# Patient Record
Sex: FEMALE | Race: WHITE
[De-identification: names, ages, dates, MRNs, and addresses within clinical notes are randomized per-mention and may not be internally consistent; named-entity substitution may affect disease eponyms.]

---

## 2017-01-03 ENCOUNTER — HOSPITAL ENCOUNTER (EMERGENCY)
Dept: HOSPITAL 17 - PHED | Age: 37
Discharge: LEFT BEFORE DISCHARGE | End: 2017-01-03
Payer: MEDICAID

## 2017-01-03 ENCOUNTER — HOSPITAL ENCOUNTER (EMERGENCY)
Dept: HOSPITAL 17 - NEPE | Age: 37
LOS: 1 days | Discharge: HOME | End: 2017-01-04
Payer: MEDICAID

## 2017-01-03 VITALS
DIASTOLIC BLOOD PRESSURE: 80 MMHG | HEART RATE: 103 BPM | OXYGEN SATURATION: 98 % | SYSTOLIC BLOOD PRESSURE: 159 MMHG | RESPIRATION RATE: 16 BRPM | TEMPERATURE: 97.2 F

## 2017-01-03 VITALS — WEIGHT: 99.21 LBS | HEIGHT: 63 IN | BODY MASS INDEX: 17.58 KG/M2

## 2017-01-03 VITALS
SYSTOLIC BLOOD PRESSURE: 140 MMHG | TEMPERATURE: 98.6 F | OXYGEN SATURATION: 98 % | RESPIRATION RATE: 18 BRPM | DIASTOLIC BLOOD PRESSURE: 103 MMHG | HEART RATE: 111 BPM

## 2017-01-03 VITALS — BODY MASS INDEX: 17.97 KG/M2 | HEIGHT: 63 IN | WEIGHT: 101.41 LBS

## 2017-01-03 DIAGNOSIS — Z53.20: ICD-10-CM

## 2017-01-03 DIAGNOSIS — R10.2: ICD-10-CM

## 2017-01-03 DIAGNOSIS — R11.2: ICD-10-CM

## 2017-01-03 DIAGNOSIS — N80.9: ICD-10-CM

## 2017-01-03 DIAGNOSIS — R10.9: Primary | ICD-10-CM

## 2017-01-03 DIAGNOSIS — Z87.19: ICD-10-CM

## 2017-01-03 DIAGNOSIS — R10.84: Primary | ICD-10-CM

## 2017-01-03 PROCEDURE — 96372 THER/PROPH/DIAG INJ SC/IM: CPT

## 2017-01-03 PROCEDURE — 99283 EMERGENCY DEPT VISIT LOW MDM: CPT

## 2017-01-03 PROCEDURE — 99284 EMERGENCY DEPT VISIT MOD MDM: CPT

## 2017-01-03 NOTE — PD
HPI


Chief Complaint:  Abdominal Pain


Time Seen by Provider:  15:19


Travel History


International Travel<30 days:  No


Contact w/Intl Traveler<30days:  No


Traveled to known affect area:  No





History of Present Illness


HPI


36-year-old female complains of abdominal pain.  Patient has history recurrent 

abdominal pain.  Patient states that she has history of endometriosis and 

awaiting appointment with gynecologist.  Patient has been to in emergency room 

multiple times in the past for the same problem.  Blood tests and CT scan 

abdomen pelvis essentially unchanged much.  Occasionally patient has mild 

hypokalemia.  Patient denies any fever chills.  Patient states that she has 

intermittent nausea vomiting with the pain.  Patient denies any dysuria or 

frequency.  Patient denies any vaginal discharge or bleeding.  On a scale of 1-

10 the pain is a 10.





PFSH


Past Medical History


Hx Anticoagulant Therapy:  No


Arthritis:  No


Asthma:  No


Autoimmune Disease:  No


Blood Disorders:  No


Anxiety:  No


Depression:  No


Heart Rhythm Problems:  No


Cancer:  No


Cardiovascular Problems:  No


High Cholesterol:  No


Chemotherapy:  No


Chest Pain:  No


Congestive Heart Failure:  No


COPD:  No


Cerebrovascular Accident:  No


Diabetes:  No


Diminished Hearing:  No


Endocrine:  No


Gastrointestinal Disorders:  Yes (GASTROPARESIS, poss. IBS, chronic abdominal 

pain)


GERD:  Yes


Glaucoma:  No


Genitourinary:  No


Headaches:  No


Hepatitis:  No


Hiatal Hernia:  Yes


Hypertension:  No


Immune Disorder:  No


Implanted Vascular Access Dvce:  No


Kidney Stones:  No


Musculoskeletal:  No


Neurologic:  Yes


Psychiatric:  No


Reproductive:  Yes (endometriosis)


Respiratory:  No


Immunizations Current:  No


Migraines:  No


Myocardial Infarction:  No


Radiation Therapy:  No


Seizures:  No


Sickle Cell Disease:  No


Sleep Apnea:  No


Thyroid Disease:  No


Ulcer:  Yes


Pregnant?:  Not Pregnant


:  3


Para:  2


Miscarriage:  0


:  1


Ovarian Cysts:  Yes





Past Surgical History


Abdominal Surgery:  Yes (multiple laps for scar tissue removal)


AICD:  No


Appendectomy:  No


Arteriovenous Shunt:  No


Cardiac Surgery:  No


 Section:  Yes


Cholecystectomy:  No


Ear Surgery:  No


Endocrine Surgery:  No


Eye Surgery:  No


Genitourinary Surgery:  Yes


Gynecologic Surgery:  Yes (csection/ lining of uterus removed)


Insulin Pump:  No


Joint Replacement:  No


Neurologic Surgery:  Yes


Oral Surgery:  No


Pacemaker:  No


Thoracic Surgery:  No


Tonsillectomy:  Yes





Social History


Alcohol Use:  No


Tobacco Use:  No


Substance Use:  Yes (SOCIAL marijuana)





Allergies-Medications


(Allergen,Severity, Reaction):  


Coded Allergies:  


     Amoxicillin (Verified  Allergy, Severe, VOMITING, 1/3/17)


     Morphine (Verified  Allergy, Severe, Hives , 1/3/17)


     Penicillin (Verified  Allergy, Severe, VOMITING, 1/3/17)


     Sulfa (Verified  Adverse Reaction, Severe, VOMITING, 1/3/17)


Reported Meds & Prescriptions





Reported Meds & Active Scripts


Active


Cipro (Ciprofloxacin HCl) 250 Mg Tab 250 Mg PO BID 3 Days


Zofran Odt (Ondansetron Odt) 4 Mg Tab 4 Mg SL Q6HR PRN


Acetaminophen 500 Mg Cap 500 Mg PO Q4-6H PRN








Review of Systems


General / Constitutional:  No: Fever


Eyes:  No: Visual changes


HENT:  No: Headaches


Cardiovascular:  No: Chest Pain or Discomfort


Respiratory:  No: Shortness of Breath


Gastrointestinal:  Positive: Nausea, Vomiting, Abdominal Pain


Genitourinary:  No: Dysuria


Musculoskeletal:  No: Pain


Skin:  No Rash


Neurologic:  No: Weakness


Psychiatric:  No: Depression


Endocrine:  No: Polydipsia


Hematologic/Lymphatic:  No: Easy Bruising





Physical Exam


Narrative


GENERAL: Well-nourished, well-developed patient.


SKIN: Warm and dry.


HEAD: Normocephalic.


EYES: No scleral icterus. No injection or drainage. 


NECK: Supple, trachea midline. No JVD or lymphadenopathy.


CARDIOVASCULAR: Regular rate and rhythm without murmurs, gallops, or rubs. 


RESPIRATORY: Breath sounds equal bilaterally. No accessory muscle use.


GASTROINTESTINAL: Abdomen soft, nondistended.  Patient has mild diffuse 

tenderness over the abdomen.  No rebound tenderness.  No mass.


MUSCULOSKELETAL: No cyanosis, or edema. 


BACK: Nontender without obvious deformity. No CVA tenderness. 


Neurologic exam normal.





Data


Data


Last Documented VS





Vital Signs








  Date Time  Temp Pulse Resp B/P Pulse Ox O2 Delivery O2 Flow Rate FiO2


 


1/3/17 14:23 98.6 111 18 140/103 98   








Orders





 Urinalysis - C+S If Indicated (1/3/17 14:17)


Ed Urine Pregnancytest Poc (1/3/17 14:17)


Ketorolac Inj (Toradol Inj) (1/3/17 15:30)


Promethazine Inj (Phenergan Inj) (1/3/17 15:30)








MDM


Medical Decision Making


Medical Screen Exam Complete:  Yes


Emergency Medical Condition:  Yes


Medical Record Reviewed:  Yes


Differential Diagnosis


Differential diagnosis including chronic recurrent abdominal pain.  Gastritis, 

PUD, appendicitis, cholecystitis, colitis, UTI, pyelonephritis.


Narrative Course


36-year-old female with recurrent abdominal pain and pelvic pain.  Patient was 

offered Toradol and Phenergan for pain.  Patient refused and walked out.





Diagnosis





 Primary Impression:  


 Recurrent abdominal pain


Patient Instructions:  General Instructions





***Additional Instructions:


Patient left AMA.


***Med/Other Pt SpecificInfo:  No Change to Meds


Disposition:  07 AGAINST MEDICAL ADVICE


Condition:  Yan Collins MD Oren 3, 2017 15:34

## 2017-02-17 ENCOUNTER — HOSPITAL ENCOUNTER (EMERGENCY)
Dept: HOSPITAL 17 - PHED | Age: 37
Discharge: LEFT BEFORE DISCHARGE | End: 2017-02-17
Payer: MEDICAID

## 2017-02-17 VITALS
TEMPERATURE: 100 F | DIASTOLIC BLOOD PRESSURE: 87 MMHG | OXYGEN SATURATION: 100 % | RESPIRATION RATE: 16 BRPM | HEART RATE: 97 BPM | SYSTOLIC BLOOD PRESSURE: 143 MMHG

## 2017-02-17 VITALS
RESPIRATION RATE: 17 BRPM | HEART RATE: 73 BPM | TEMPERATURE: 98 F | SYSTOLIC BLOOD PRESSURE: 107 MMHG | DIASTOLIC BLOOD PRESSURE: 74 MMHG | OXYGEN SATURATION: 98 %

## 2017-02-17 VITALS — WEIGHT: 102.07 LBS | BODY MASS INDEX: 18.09 KG/M2 | HEIGHT: 63 IN

## 2017-02-17 VITALS
HEART RATE: 126 BPM | RESPIRATION RATE: 22 BRPM | OXYGEN SATURATION: 100 % | DIASTOLIC BLOOD PRESSURE: 90 MMHG | SYSTOLIC BLOOD PRESSURE: 135 MMHG | TEMPERATURE: 98.3 F

## 2017-02-17 DIAGNOSIS — K31.84: ICD-10-CM

## 2017-02-17 DIAGNOSIS — N80.9: ICD-10-CM

## 2017-02-17 DIAGNOSIS — N83.201: ICD-10-CM

## 2017-02-17 DIAGNOSIS — K58.9: ICD-10-CM

## 2017-02-17 DIAGNOSIS — D72.829: ICD-10-CM

## 2017-02-17 DIAGNOSIS — K21.9: ICD-10-CM

## 2017-02-17 DIAGNOSIS — Z76.5: ICD-10-CM

## 2017-02-17 DIAGNOSIS — R10.2: Primary | ICD-10-CM

## 2017-02-17 DIAGNOSIS — K44.9: ICD-10-CM

## 2017-02-17 DIAGNOSIS — E87.6: ICD-10-CM

## 2017-02-17 DIAGNOSIS — G89.29: ICD-10-CM

## 2017-02-17 LAB
ANION GAP SERPL CALC-SCNC: 13 MEQ/L (ref 5–15)
BASOPHILS # BLD AUTO: 0.2 TH/MM3 (ref 0–0.2)
BASOPHILS NFR BLD: 0.8 % (ref 0–2)
BUN SERPL-MCNC: 28 MG/DL (ref 7–18)
CHLORIDE SERPL-SCNC: 99 MEQ/L (ref 98–107)
COLOR UR: YELLOW
COMMENT (UR): (no result)
CULTURE IF INDICATED: (no result)
EOSINOPHIL # BLD: 0 TH/MM3 (ref 0–0.4)
EOSINOPHIL NFR BLD: 0 % (ref 0–4)
ERYTHROCYTE [DISTWIDTH] IN BLOOD BY AUTOMATED COUNT: 12.7 % (ref 11.6–17.2)
GFR SERPLBLD BASED ON 1.73 SQ M-ARVRAT: 62 ML/MIN (ref 89–?)
GLUCOSE UR STRIP-MCNC: (no result) MG/DL
HCO3 BLD-SCNC: 24.9 MEQ/L (ref 21–32)
HCT VFR BLD CALC: 42.5 % (ref 35–46)
HEMO FLAGS: (no result)
HGB UR QL STRIP: (no result)
KETONES UR STRIP-MCNC: 15 MG/DL
LEUKOCYTE ESTERASE UR QL STRIP: (no result) /HPF (ref 0–5)
LYMPHOCYTES # BLD AUTO: 1.3 TH/MM3 (ref 1–4.8)
LYMPHOCYTES NFR BLD AUTO: 6.8 % (ref 9–44)
MCH RBC QN AUTO: 31.8 PG (ref 27–34)
MCHC RBC AUTO-ENTMCNC: 34.8 % (ref 32–36)
MCV RBC AUTO: 91.2 FL (ref 80–100)
METHOD OF COLLECTION: (no result)
MONOCYTES NFR BLD: 3.5 % (ref 0–8)
NEUTROPHILS # BLD AUTO: 16.9 TH/MM3 (ref 1.8–7.7)
NEUTROPHILS NFR BLD AUTO: 88.9 % (ref 16–70)
NITRITE UR QL STRIP: (no result)
PLATELET # BLD: 403 TH/MM3 (ref 150–450)
POTASSIUM SERPL-SCNC: 3.1 MEQ/L (ref 3.5–5.1)
RBC # BLD AUTO: 4.66 MIL/MM3 (ref 4–5.3)
SODIUM SERPL-SCNC: 137 MEQ/L (ref 136–145)
SP GR UR STRIP: 1.03 (ref 1–1.03)
SQUAMOUS #/AREA URNS HPF: (no result) /HPF (ref 0–5)
WBC # BLD AUTO: 19.1 TH/MM3 (ref 4–11)

## 2017-02-17 PROCEDURE — P9612 CATHETERIZE FOR URINE SPEC: HCPCS

## 2017-02-17 PROCEDURE — 85025 COMPLETE CBC W/AUTO DIFF WBC: CPT

## 2017-02-17 PROCEDURE — 96374 THER/PROPH/DIAG INJ IV PUSH: CPT

## 2017-02-17 PROCEDURE — 96375 TX/PRO/DX INJ NEW DRUG ADDON: CPT

## 2017-02-17 PROCEDURE — 96361 HYDRATE IV INFUSION ADD-ON: CPT

## 2017-02-17 PROCEDURE — 84703 CHORIONIC GONADOTROPIN ASSAY: CPT

## 2017-02-17 PROCEDURE — 80048 BASIC METABOLIC PNL TOTAL CA: CPT

## 2017-02-17 PROCEDURE — 87210 SMEAR WET MOUNT SALINE/INK: CPT

## 2017-02-17 PROCEDURE — 74177 CT ABD & PELVIS W/CONTRAST: CPT

## 2017-02-17 PROCEDURE — 81001 URINALYSIS AUTO W/SCOPE: CPT

## 2017-02-17 PROCEDURE — 76856 US EXAM PELVIC COMPLETE: CPT

## 2017-02-17 PROCEDURE — 99284 EMERGENCY DEPT VISIT MOD MDM: CPT

## 2017-02-17 PROCEDURE — 93975 VASCULAR STUDY: CPT

## 2017-02-17 NOTE — RADHPO
EXAM DATE/TIME:  2017 12:55 

 

[facility] COMPARISON:     

CT ABDOMEN & PELVIS W CONTRAST, 2017, 7:28.

        

 

 

INDICATIONS :     

Pelvic pain. Abnormal abdomen pelvic CT demonstrating a 2.2 cm right adnexal cyst.

                     

 

MEDICAL HISTORY :     

Gastroesophageal reflux disease. Gastroparesis. Hernia, hiatal. Head trauma. Ulcer. Endometriosis. Ov
epi cyst.

 

SURGICAL HISTORY :     

Tonsillectomy.  section.   Uterine ablation.

 

ENCOUNTER:     

Initial

 

ACUITY:     

3 days

 

PAIN SCORE:     

3/10

 

LOCATION:     

Right pelvis 

                     

MEASUREMENTS:     

 

UTERUS:                                  

7.5 x 4.2 x 5.0 cm 

 

ENDOMETRIAL STRIPE:      

7 mm

 

RIGHT OVARY:                      

3.0 x 2.7 x 2.3 cm     

 

LEFT OVARY:                         

3.2 x 2.0 x 2.1 cm     

 

FINDINGS:     

 

UTERUS:     

The myometrium has homogeneous echotexture without mass.  

 

RIGHT OVARY:     

There is a 1.7 x 1.8 x 1.8 cm cystic structure in the right ovary with apparent mild septation. There
 is no abnormal color flow.

 

LEFT OVARY:     

Ovary contains no mass or significant cystic lesion.  

 

MISCELLANEOUS:     

Small amount of fluid in the cul-de-sac.

 

CONCLUSION:     

1. Mild to complex right ovarian cyst.

2. Small amount of free fluid in the cul-de-sac.

 

 

 

 Lavon Mock MD on 2017 at 9:10           

Board Certified Radiologist.

 This report was verified electronically.

## 2017-02-17 NOTE — PD
HPI


Chief Complaint:  Gyn Problem/Complaint


Time Seen by Provider:  06:13


Travel History


International Travel<30 days:  No


Contact w/Intl Traveler<30days:  No


Traveled to known affect area:  No





History of Present Illness


HPI


36-year-old female presents to the emergency department by private 

transportation for evaluation of lower abdominal pain.  Patient reports that 

she has been diagnosed with endometriosis and intractable pain.  Patient 

reports she was seen at [facility] in January.  Patient reports at that 

time she was told that she would be given a prescription for Provera.  If her 

symptoms did not resolve that she would need to have a hysterectomy.  She 

reportedly had uterine ablation in .  Patient states that she feels as if 

she is going to have her menstrual cycle although she no longer menstruates.  

Patient has had no fever or chills.  Patient states she last took Percocet on 

Wednesday and has had vomiting since that time.  Patient reports that she needs 

Zofran and Dilaudid as is the only thing that controls her pain.  Patient does 

not report any hematemesis coffee-ground emesis melena or hematochezia.  

Patient denies dysuria frequency or urgency.  Patient denies vaginal bleeding 

or vaginal discharge.  Patient does not have a local OB/GYN.  Patient is to be 

followed by Dr. Bardales in [place].  Patient is not scheduled to see the [facility] 

gynecologist until April.  Patient also has history of gastroparesis and cyclic 

vomiting syndrome.





[facility]


Past Medical History


*** Narrative Medical


Endometriosis, cyclic vomiting syndrome, gastroparesis, GERD, hiatal hernia, C-

section, laparotomy, laparoscopic pelvic surgery, uterine ablation; marijuana 

use; nursing notes reviewed


Hx Anticoagulant Therapy:  No


Arthritis:  No


Asthma:  No


Autoimmune Disease:  No


Blood Disorders:  No


Anxiety:  No


Depression:  No


Heart Rhythm Problems:  No


Cancer:  No


Cardiovascular Problems:  No


High Cholesterol:  No


Chemotherapy:  No


Chest Pain:  No


Congestive Heart Failure:  No


COPD:  No


Cerebrovascular Accident:  No


Diabetes:  No


Diminished Hearing:  No


Endocrine:  No


Gastrointestinal Disorders:  Yes (GASTROPARESIS, poss. IBS, chronic abdominal 

pain)


GERD:  Yes


Glaucoma:  No


Genitourinary:  No


Headaches:  No


Hepatitis:  No


Hiatal Hernia:  Yes


Hypertension:  No


Immune Disorder:  No


Implanted Vascular Access Dvce:  No


Kidney Stones:  No


Musculoskeletal:  No


Neurologic:  Yes


Psychiatric:  No


Reproductive:  Yes (endometriosis)


Respiratory:  No


Immunizations Current:  No


Migraines:  No


Myocardial Infarction:  No


Radiation Therapy:  No


Seizures:  No


Sickle Cell Disease:  No


Sleep Apnea:  No


Thyroid Disease:  No


Ulcer:  Yes


Pregnant?:  Not Pregnant


:  3


Para:  2


Miscarriage:  0


:  1


Ovarian Cysts:  Yes





Past Surgical History


Abdominal Surgery:  Yes (multiple laps for scar tissue removal)


AICD:  No


Appendectomy:  No


Arteriovenous Shunt:  No


Cardiac Surgery:  No


 Section:  Yes


Cholecystectomy:  No


Ear Surgery:  No


Endocrine Surgery:  No


Eye Surgery:  No


Genitourinary Surgery:  Yes


Gynecologic Surgery:  Yes (csection/ lining of uterus removed)


Insulin Pump:  No


Joint Replacement:  No


Neurologic Surgery:  Yes


Oral Surgery:  No


Pacemaker:  No


Thoracic Surgery:  No


Tonsillectomy:  Yes


Other Surgery:  Yes (colon polyp rempoval)





Social History


Alcohol Use:  No


Tobacco Use:  No (QUIT AGE 34)


Substance Use:  Yes (SOCIAL marijuana)





Allergies-Medications


(Allergen,Severity, Reaction):  


Coded Allergies:  


     Amoxicillin (Verified  Allergy, Severe, VOMITING, 17)


     Morphine (Verified  Allergy, Severe, Hives , 17)


     Penicillin (Verified  Allergy, Severe, VOMITING, 17)


     Sulfa (Verified  Adverse Reaction, Severe, VOMITING, 17)


Reported Meds & Prescriptions





Reported Meds & Active Scripts


Active


Reported


Provera (Medroxyprogesterone Acetate) 2.5 Mg Tab 2.5 Mg PO DAILY


     Start day 21





Narrative Medication


Percocet





Review of Systems


Except as stated in HPI:  all other systems reviewed are Neg


General / Constitutional:  No: Fever, Chills


HENT:  No: Congestion


Cardiovascular:  No: Chest Pain or Discomfort


Respiratory:  No: Shortness of Breath


Gastrointestinal:  Positive: Nausea, Vomiting, Abdominal Pain,  No: Hematemesis

, Hematochezia


Genitourinary:  Positive: Pelvic Pain,  No: Urgency, Frequency, Dysuria, 

Discharge, Vaginal Bleeding


Musculoskeletal:  No: Myalgias, Arthralgias


Skin:  No Rash


Neurologic:  No: Weakness


Psychiatric:  No: Anxiety


Hematologic/Lymphatic:  No: Easy Bruising





Physical Exam


Narrative


GENERAL: Well-developed well-nourished thin female writhing back and forth on 

the bed stating that her pelvic pain is so severe she can't tolerate it.


SKIN: Warm and dry.  Chronic irregular hyperpigmentation to the back.


HEAD: Normocephalic.


EYES: No scleral icterus. No injection or drainage. 


NECK: Supple, trachea midline. No JVD or lymphadenopathy.


CARDIOVASCULAR: Regular rate and rhythm without murmurs, gallops, or rubs. 


RESPIRATORY: Breath sounds equal bilaterally. No accessory muscle use.


GASTROINTESTINAL: Abdomen soft, non-tender, nondistended. 


MUSCULOSKELETAL: No cyanosis, or edema. 


BACK: Nontender without obvious deformity. No CVA tenderness.








Data


Data


Last Documented VS





Vital Signs








  Date Time  Temp Pulse Resp B/P Pulse Ox O2 Delivery O2 Flow Rate FiO2


 


17 05:13 98.3 126 22 135/90 100   








Orders





 Complete Blood Count With Diff (17 06:07)


Basic Metabolic Panel (Bmp) (17 06:07)


Wet Prep Profile (17 06:07)


Urinalysis - C+S If Indicated (17 06:07)


Iv Access Insert/Monitor (17 06:07)


Sodium Chlor 0.9% 1000 Ml Inj (Ns 1000 M (17 06:07)


Ed Urine Pregnancytest Poc (17 06:07)


Ketorolac Inj (Toradol Inj) (17 06:15)


Ondansetron Inj (Zofran Inj) (17 06:15)


Ct Abd/Pel W Iv Contrast(Rout) (17 )


Us Pelvis Comp W Dop Transvag (17 )


Hydromorphone Pf Inj (Dilaudid Pf Inj) (17 07:00)


Cath For Specimen (17 06:52)





Labs








 Laboratory Tests








Test 17





 06:05 06:20


 


Clue Cells (Wet Prep) NONE SEEN  


 


Vaginal Trichomonas (Wet Prep) NONE SEEN  


 


Vaginal Yeast (Wet Prep) NONE SEEN  


 


White Blood Count  19.1 TH/MM3


 


Red Blood Count  4.66 MIL/MM3


 


Hemoglobin  14.8 GM/DL


 


Hematocrit  42.5 %


 


Mean Corpuscular Volume  91.2 FL


 


Mean Corpuscular Hemoglobin  31.8 PG


 


Mean Corpuscular Hemoglobin  34.8 %





Concent  


 


Red Cell Distribution Width  12.7 %


 


Platelet Count  403 TH/MM3


 


Mean Platelet Volume  8.0 FL


 


Neutrophils (%) (Auto)  88.9 %


 


Lymphocytes (%) (Auto)  6.8 %


 


Monocytes (%) (Auto)  3.5 %


 


Eosinophils (%) (Auto)  0.0 %


 


Basophils (%) (Auto)  0.8 %


 


Neutrophils # (Auto)  16.9 TH/MM3


 


Lymphocytes # (Auto)  1.3 TH/MM3


 


Monocytes # (Auto)  0.7 TH/MM3


 


Eosinophils # (Auto)  0.0 TH/MM3


 


Basophils # (Auto)  0.2 TH/MM3


 


CBC Comment  DIFF FINAL 


 


Differential Comment   


 


Sodium Level  137 MEQ/L


 


Potassium Level  3.1 MEQ/L


 


Chloride Level  99 MEQ/L


 


Carbon Dioxide Level  24.9 MEQ/L


 


Anion Gap  13 MEQ/L


 


Blood Urea Nitrogen  28 MG/DL


 


Creatinine  1.00 MG/DL


 


Estimat Glomerular Filtration  62 ML/MIN





Rate  


 


Random Glucose  166 MG/DL


 


Calcium Level  9.9 MG/DL














[facility]


Medical Decision Making


Medical Screen Exam Complete:  Yes


Emergency Medical Condition:  Yes


Medical Record Reviewed:  Yes


Interpretation(s)





 Vital Signs








  Date Time  Temp Pulse Resp B/P Pulse Ox O2 Delivery O2 Flow Rate FiO2


 


17 05:13 98.3 126 22 135/90 100   





CBC & BMP Diagram


17 06:20








Differential Diagnosis


Chronic pain syndrome, endometriosis, UTI, ruptured ovarian cyst, ovarian 

torsion, ectopic pregnancy, narcotic withdrawal , drug seeking behavior


Narrative Course


IV access obtained urine specimen collected point-of-care hCG obtained patient 

 normal saline bolus and Zofran 4 mg IV and offered Toradol 30 mg IV


Care signed over to oncoming physician Dr. Pyle for pending follow-up of 

pending labs and imaging studies and patient disposition








Hannah Parsons MD 2017 06:21

## 2017-02-17 NOTE — RADHPO
EXAM DATE/TIME:  2017 07:28 

 

[facility] COMPARISON:     CT ABDOMEN & PELVIS W/O CONTRAST, 2016, 8:34.

 

INDICATIONS :     Lower abdominal pain x 3 days. 

                      

IV CONTRAST:     80 cc Omnipaque 350 (iohexol) IV 

 

ORAL CONTRAST:      No oral contrast ingested.

                      

RADIATION DOSE:     4.45 CTDIvol (mGy) 

 

MEDICAL HISTORY :     Gastroesophageal reflux disease.  

SURGICAL HISTORY :       section. Hysterectomy.Uterine ablation. 

ENCOUNTER:      Initial

ACUITY:      3 days

PAIN SCALE:      9/10

LOCATION:       Bilateral lower quadrant 

 

TECHNIQUE:     Volumetric scanning of the abdomen and pelvis was performed.  Using automated exposure
 control and adjustment of the mA and/or kV according to patient size, radiation dose was kept as low
 as reasonably achievable to obtain optimal diagnostic quality images. 

 

FINDINGS:     

LOWER LUNGS:     The visualized lower lungs are clear.

LIVER:     Homogeneous density without lesion.  There is no dilation of the biliary tree.  No calcifi
ed gallstones. Gallbladder seen as a luminal structure without wall thickening

SPLEEN:     Normal size without lesion.

PANCREAS:     Within normal limits.

KIDNEYS:     Normal in size and shape.  There is no mass, stone or hydronephrosis.

ADRENAL GLANDS:     Within normal limits.

VASCULAR:     There is no aortic aneurysm.

BOWEL/MESENTERY:     The stomach, small bowel, and colon demonstrate no acute abnormality.  There is 
no free intraperitoneal air or fluid.

ABDOMINAL WALL:     Within normal limits.

RETROPERITONEUM:     There is no lymphadenopathy. 

BLADDER:     No wall thickening or mass. 

REPRODUCTIVE:     2.2 cm right pelvic adnexal probably ovarian cyst..

INGUINAL:     There is no lymphadenopathy or hernia. 

MUSCULOSKELETAL:     Within normal limits for patient age. 

 

CONCLUSION:     2.2 cm right adnexal ovarian cyst 

 

 

 Zack Thacker MD on 2017 at 7:54           

Board Certified Radiologist.

 This report was verified electronically.

## 2017-02-17 NOTE — PD
Data


Data


Last Documented VS





Vital Signs








  Date Time  Temp Pulse Resp B/P Pulse Ox O2 Delivery O2 Flow Rate FiO2


 


2/17/17 09:13 98.0 73 17 107/74 98 Room Air  








Orders





 Complete Blood Count With Diff (2/17/17 06:07)


Basic Metabolic Panel (Bmp) (2/17/17 06:07)


Wet Prep Profile (2/17/17 06:07)


Urinalysis - C+S If Indicated (2/17/17 06:07)


Iv Access Insert/Monitor (2/17/17 06:07)


Sodium Chlor 0.9% 1000 Ml Inj (Ns 1000 M (2/17/17 06:07)


Ed Urine Pregnancytest Poc (2/17/17 06:07)


Ketorolac Inj (Toradol Inj) (2/17/17 06:15)


Ondansetron Inj (Zofran Inj) (2/17/17 06:15)


Ct Abd/Pel W Iv Contrast(Rout) (2/17/17 )


Hydromorphone Pf Inj (Dilaudid Pf Inj) (2/17/17 07:00)


Cath For Specimen (2/17/17 06:52)


Iohexol 350 Inj (Omnipaque 350 Inj) (2/17/17 07:39)


Us Pelvis Comp W Doppler (2/17/17 )





Labs





 Laboratory Tests








Test 2/17/17 2/17/17 2/17/17





 06:05 06:20 06:55


 


Clue Cells (Wet Prep) NONE SEEN   


 


Vaginal Trichomonas (Wet Prep) NONE SEEN   


 


Vaginal Yeast (Wet Prep) NONE SEEN   


 


White Blood Count  19.1 TH/MM3 


 


Red Blood Count  4.66 MIL/MM3 


 


Hemoglobin  14.8 GM/DL 


 


Hematocrit  42.5 % 


 


Mean Corpuscular Volume  91.2 FL 


 


Mean Corpuscular Hemoglobin  31.8 PG 


 


Mean Corpuscular Hemoglobin  34.8 % 





Concent   


 


Red Cell Distribution Width  12.7 % 


 


Platelet Count  403 TH/MM3 


 


Mean Platelet Volume  8.0 FL 


 


Neutrophils (%) (Auto)  88.9 % 


 


Lymphocytes (%) (Auto)  6.8 % 


 


Monocytes (%) (Auto)  3.5 % 


 


Eosinophils (%) (Auto)  0.0 % 


 


Basophils (%) (Auto)  0.8 % 


 


Neutrophils # (Auto)  16.9 TH/MM3 


 


Lymphocytes # (Auto)  1.3 TH/MM3 


 


Monocytes # (Auto)  0.7 TH/MM3 


 


Eosinophils # (Auto)  0.0 TH/MM3 


 


Basophils # (Auto)  0.2 TH/MM3 


 


CBC Comment  DIFF FINAL  


 


Differential Comment    


 


Sodium Level  137 MEQ/L 


 


Potassium Level  3.1 MEQ/L 


 


Chloride Level  99 MEQ/L 


 


Carbon Dioxide Level  24.9 MEQ/L 


 


Anion Gap  13 MEQ/L 


 


Blood Urea Nitrogen  28 MG/DL 


 


Creatinine  1.00 MG/DL 


 


Estimat Glomerular Filtration  62 ML/MIN 





Rate   


 


Random Glucose  166 MG/DL 


 


Calcium Level  9.9 MG/DL 


 


Urine Collection Type   CLEAN CATCH 


 


Urine Color   YELLOW 


 


Urine Turbidity   MARKED 


 


Urine pH   5.5 


 


Urine Specific Gravity   1.033 


 


Urine Protein   100 mg/dL


 


Urine Glucose (UA)   NEG mg/dL


 


Urine Ketones   15 mg/dL


 


Urine Occult Blood   NEG 


 


Urine Nitrite   NEG 


 


Urine Bilirubin   NEG 


 


Urine Leukocyte Esterase   NEG 


 


Urine WBC   0-2 /hpf


 


Urine Squamous Epithelial   0-5 /hpf





Cells   


 


Urine Amorphous Sediment   LARGE 


 


Microscopic Urinalysis Comment   CULT NOT





   INDICATED


 


Urine Collection Time   06:55 











MDM


Supervised Visit with KRISTOFER:  No


Narrative Course


This case is checked out to me by Dr. Parsons at 7 AM.





I have reviewed the entirety of the workup and discussed them with the patient.


She had leukocytosis and hypokalemia on lab studies.  Extensive imaging 

including CT of abdomen and pelvis and ultrasound both were negative except for 

a minor right ovarian cyst which I suspect is an incidental finding.





This patient clearly has some narcotic issues.  When I went into the room she 

started begging and badgering me for IV Dilaudid.  I didn't feel this was 

indicated.  I discussed other options and offered her other things which she 

refused.  She got upset and started crying.  I suspect she is having some 

emotional pain and likely narcotic withdrawal symptoms.  As noted, the patient 

got upset and stomped out of the emergency room.  She did not let me finish.  

She did not get instructions or prescriptions.  Behavior is highly suspicious 

for narcotic seeking behavior.


Diagnosis





 Primary Impression:  


 Recurrent abdominal pain


 Additional Impression:  


 Drug-seeking behavior


Patient Instructions:  General Instructions


Departure Forms:  Tests/Procedures


Disposition:  07 AGAINST MEDICAL ADVICE








Rasheed Pyle MD Feb 17, 2017 10:37

## 2017-03-03 ENCOUNTER — HOSPITAL ENCOUNTER (EMERGENCY)
Dept: HOSPITAL 17 - NEPC | Age: 37
Discharge: LEFT BEFORE DISCHARGE | End: 2017-03-03
Payer: MEDICAID

## 2017-03-03 VITALS
DIASTOLIC BLOOD PRESSURE: 90 MMHG | SYSTOLIC BLOOD PRESSURE: 148 MMHG | OXYGEN SATURATION: 97 % | RESPIRATION RATE: 16 BRPM | HEART RATE: 120 BPM | TEMPERATURE: 98.3 F

## 2017-03-03 VITALS — RESPIRATION RATE: 16 BRPM

## 2017-03-03 VITALS — RESPIRATION RATE: 16 BRPM | OXYGEN SATURATION: 98 %

## 2017-03-03 VITALS
RESPIRATION RATE: 16 BRPM | DIASTOLIC BLOOD PRESSURE: 72 MMHG | SYSTOLIC BLOOD PRESSURE: 136 MMHG | HEART RATE: 92 BPM | OXYGEN SATURATION: 98 %

## 2017-03-03 VITALS — BODY MASS INDEX: 17.58 KG/M2 | WEIGHT: 99.21 LBS | HEIGHT: 63 IN

## 2017-03-03 DIAGNOSIS — R10.9: Primary | ICD-10-CM

## 2017-03-03 DIAGNOSIS — G89.29: ICD-10-CM

## 2017-03-03 LAB
ALP SERPL-CCNC: 64 U/L (ref 45–117)
ALT SERPL-CCNC: 17 U/L (ref 10–53)
ANION GAP SERPL CALC-SCNC: 13 MEQ/L (ref 5–15)
AST SERPL-CCNC: 11 U/L (ref 15–37)
BASOPHILS # BLD AUTO: 0.1 TH/MM3 (ref 0–0.2)
BASOPHILS NFR BLD: 0.7 % (ref 0–2)
BILIRUB SERPL-MCNC: 0.8 MG/DL (ref 0.2–1)
BUN SERPL-MCNC: 20 MG/DL (ref 7–18)
CHLORIDE SERPL-SCNC: 99 MEQ/L (ref 98–107)
EOSINOPHIL # BLD: 0.1 TH/MM3 (ref 0–0.4)
EOSINOPHIL NFR BLD: 0.4 % (ref 0–4)
ERYTHROCYTE [DISTWIDTH] IN BLOOD BY AUTOMATED COUNT: 13.4 % (ref 11.6–17.2)
GFR SERPLBLD BASED ON 1.73 SQ M-ARVRAT: 51 ML/MIN (ref 89–?)
HCO3 BLD-SCNC: 18.8 MEQ/L (ref 21–32)
HCT VFR BLD CALC: 49.7 % (ref 35–46)
HEMO FLAGS: (no result)
LYMPHOCYTES # BLD AUTO: 1.9 TH/MM3 (ref 1–4.8)
LYMPHOCYTES NFR BLD AUTO: 13.2 % (ref 9–44)
MCH RBC QN AUTO: 31.4 PG (ref 27–34)
MCHC RBC AUTO-ENTMCNC: 34.6 % (ref 32–36)
MCV RBC AUTO: 90.8 FL (ref 80–100)
MONOCYTES NFR BLD: 4.2 % (ref 0–8)
NEUTROPHILS # BLD AUTO: 11.7 TH/MM3 (ref 1.8–7.7)
NEUTROPHILS NFR BLD AUTO: 81.5 % (ref 16–70)
PLATELET # BLD: 426 TH/MM3 (ref 150–450)
POTASSIUM SERPL-SCNC: 2.7 MEQ/L (ref 3.5–5.1)
RBC # BLD AUTO: 5.47 MIL/MM3 (ref 4–5.3)
SODIUM SERPL-SCNC: 131 MEQ/L (ref 136–145)
WBC # BLD AUTO: 14.3 TH/MM3 (ref 4–11)

## 2017-03-03 PROCEDURE — 80053 COMPREHEN METABOLIC PANEL: CPT

## 2017-03-03 PROCEDURE — 99284 EMERGENCY DEPT VISIT MOD MDM: CPT

## 2017-03-03 PROCEDURE — 96361 HYDRATE IV INFUSION ADD-ON: CPT

## 2017-03-03 PROCEDURE — 84703 CHORIONIC GONADOTROPIN ASSAY: CPT

## 2017-03-03 PROCEDURE — 85025 COMPLETE CBC W/AUTO DIFF WBC: CPT

## 2017-03-03 PROCEDURE — 96365 THER/PROPH/DIAG IV INF INIT: CPT

## 2017-03-03 PROCEDURE — 96375 TX/PRO/DX INJ NEW DRUG ADDON: CPT

## 2017-03-03 PROCEDURE — 83690 ASSAY OF LIPASE: CPT

## 2017-03-03 NOTE — PD
HPI


Chief Complaint:  Abdominal Pain


Time Seen by Provider:  07:02


Travel History


International Travel<30 days:  No


Contact w/Intl Traveler<30days:  No


Traveled to known affect area:  No





History of Present Illness


HPI


35yo F with PMH of endometriosis, gastroparesis, cyclic vomiting and chronic 

abdominal pain presents to the ED with c/o abdominal pain for 2 weeks.  +NBNB 

vomiting.  Pt denies any fever, chest pain, sob, diarrhea, urinary complaint, 

vaginal bleeding or discharge.  Pt was just seen at [place] 2 weeks ago on .  Pt had same pain then and had CTa/p that showed 2.2cm right adnexal 

ovarian cyst.  Pelvic US at that time also showed right ovarian cyst.  Pt has 

drug seeking behavior and has been here multiple times for the same abdominal 

pain.  She is begging for dilaudid and zofran and states dilaudid is the only 

medication that helps with her pain.  States she is on hormone therapy from 

[facility] but they did not give her anything for pain.





PFSH


Past Medical History


Hx Anticoagulant Therapy:  No


Arthritis:  No


Asthma:  No


Autoimmune Disease:  No


Blood Disorders:  No


Anxiety:  No


Depression:  No


Heart Rhythm Problems:  No


Cancer:  No


Cardiovascular Problems:  No


High Cholesterol:  No


Chemotherapy:  No


Chest Pain:  No


Congestive Heart Failure:  No


COPD:  No


Cerebrovascular Accident:  No


Diabetes:  No


Diminished Hearing:  No


Endocrine:  No


Gastrointestinal Disorders:  Yes (GASTROPARESIS)


GERD:  Yes


Glaucoma:  No


Genitourinary:  No


Headaches:  No


Hepatitis:  No


Hiatal Hernia:  Yes


Hypertension:  No


Immune Disorder:  No


Implanted Vascular Access Dvce:  No


Kidney Stones:  No


Musculoskeletal:  No


Neurologic:  Yes


Psychiatric:  No


Reproductive:  Yes (ENDIOMETRESIS)


Respiratory:  No


Immunizations Current:  No


Migraines:  No


Myocardial Infarction:  No


Radiation Therapy:  No


Seizures:  No


Sickle Cell Disease:  No


Sleep Apnea:  No


Thyroid Disease:  No


Ulcer:  Yes


Influenza Vaccination:  No


Pregnant?:  Not Pregnant


:  3


Para:  2


Miscarriage:  0


:  1


Ovarian Cysts:  Yes





Past Surgical History


Abdominal Surgery:  Yes (EXP LAP)


AICD:  No


Appendectomy:  No


Arteriovenous Shunt:  No


Cardiac Surgery:  No


 Section:  Yes


Cholecystectomy:  No


Ear Surgery:  No


Endocrine Surgery:  No


Eye Surgery:  No


Genitourinary Surgery:  Yes


Gynecologic Surgery:  Yes


Insulin Pump:  No


Joint Replacement:  No


Neurologic Surgery:  Yes


Oral Surgery:  No


Pacemaker:  No


Thoracic Surgery:  No


Tonsillectomy:  Yes


Other Surgery:  Yes (colon polyp rempoval)





Social History


Alcohol Use:  No


Tobacco Use:  No (QUIT )


Substance Use:  Yes (SOCIAL marijuana)





Allergies-Medications


(Allergen,Severity, Reaction):  


Coded Allergies:  


     Amoxicillin (Verified  Allergy, Severe, VOMITING, 3/3/17)


     Morphine (Verified  Allergy, Severe, Hives , 3/3/17)


     Penicillin (Verified  Allergy, Severe, VOMITING, 3/3/17)


     Sulfa (Verified  Adverse Reaction, Severe, VOMITING, 3/3/17)


Reported Meds & Prescriptions





Reported Meds & Active Scripts


Active


Reported


Provera (Medroxyprogesterone Acetate) 2.5 Mg Tab 2.5 Mg PO DAILY


     Start day 21








Review of Systems


Except as stated in HPI:  all other systems reviewed are Neg





Physical Exam


Narrative


GENERAL: 35yo F not in acute distress.


SKIN: Warm and dry.


HEAD: Atraumatic. Normocephalic. 


NECK: Trachea midline. No JVD. 


CARDIOVASCULAR: Regular rate and rhythm.  No murmur appreciated.


RESPIRATORY: No accessory muscle use. Clear to auscultation. Breath sounds 

equal bilaterally. 


GASTROINTESTINAL: Abdomen soft, no guarding or rebound tenderness.  States pain 

everywhere but no point tenderness on my exam.


MUSCULOSKELETAL: No obvious deformities. No clubbing.  No cyanosis.  No edema. 


NEUROLOGICAL: Awake and alert. No obvious cranial nerve deficits.  Motor 

grossly within normal limits. Normal speech.





Data


Data


Last Documented VS





Vital Signs








  Date Time  Temp Pulse Resp B/P Pulse Ox O2 Delivery O2 Flow Rate FiO2


 


3/3/17 08:00  92 16 136/72 98 Room Air  


 


3/3/17 06:38 98.3       








Orders





 Complete Blood Count With Diff (3/3/17 07:10)


Comprehensive Metabolic Panel (3/3/17 07:10)


Lipase (3/3/17 07:10)


Urinalysis - C+S If Indicated (3/3/17 07:10)


Iv Access Insert/Monitor (3/3/17 07:10)


Ecg Monitoring (3/3/17 07:10)


Oximetry (3/3/17 07:10)


Ondansetron Inj (Zofran Inj) (3/3/17 07:15)


Sodium Chlor 0.9% 1000 Ml Inj (Ns 1000 M (3/3/17 07:10)


Sodium Chloride 0.9% Flush (Ns Flush) (3/3/17 07:15)


Ketorolac Inj (Toradol Inj) (3/3/17 07:15)


Ed Urine Pregnancytest Poc (3/3/17 07:10)


Bhcg Screen Qualitative (3/3/17 07:11)


Ct Abd/Pel W Iv Contrast(Rout) (3/3/17 )


Potassium Chlor 20 Meq Premix (Kcl 20 Me (3/3/17 08:15)


Potassium Chloride (Kcl) (3/3/17 08:15)


Magnesium Sulfate 1 Gm Premix (Magnesium (3/3/17 08:15)


Acetaminophen (Tylenol) (3/3/17 09:00)





Labs








 Laboratory Tests








Test 3/3/17





 07:15


 


White Blood Count 14.3 TH/MM3


 


Red Blood Count 5.47 MIL/MM3


 


Hemoglobin 17.2 GM/DL


 


Hematocrit 49.7 %


 


Mean Corpuscular Volume 90.8 FL


 


Mean Corpuscular Hemoglobin 31.4 PG


 


Mean Corpuscular Hemoglobin 34.6 %





Concent 


 


Red Cell Distribution Width 13.4 %


 


Platelet Count 426 TH/MM3


 


Mean Platelet Volume 8.2 FL


 


Neutrophils (%) (Auto) 81.5 %


 


Lymphocytes (%) (Auto) 13.2 %


 


Monocytes (%) (Auto) 4.2 %


 


Eosinophils (%) (Auto) 0.4 %


 


Basophils (%) (Auto) 0.7 %


 


Neutrophils # (Auto) 11.7 TH/MM3


 


Lymphocytes # (Auto) 1.9 TH/MM3


 


Monocytes # (Auto) 0.6 TH/MM3


 


Eosinophils # (Auto) 0.1 TH/MM3


 


Basophils # (Auto) 0.1 TH/MM3


 


CBC Comment DIFF FINAL 


 


Differential Comment  


 


Sodium Level 131 MEQ/L


 


Potassium Level 2.7 MEQ/L


 


Chloride Level 99 MEQ/L


 


Carbon Dioxide Level 18.8 MEQ/L


 


Anion Gap 13 MEQ/L


 


Blood Urea Nitrogen 20 MG/DL


 


Creatinine 1.18 MG/DL


 


Estimat Glomerular Filtration 51 ML/MIN





Rate 


 


Random Glucose 211 MG/DL


 


Calcium Level 9.9 MG/DL


 


Total Bilirubin 0.8 MG/DL


 


Aspartate Amino Transf 11 U/L





(AST/SGOT) 


 


Alanine Aminotransferase 17 U/L





(ALT/SGPT) 


 


Alkaline Phosphatase 64 U/L


 


Total Protein 8.9 GM/DL


 


Albumin 4.9 GM/DL


 


Lipase 111 U/L


 


Beta HCG, Qualitative LESS THAN 1





 MIU/ML














MDM


Medical Decision Making


Medical Screen Exam Complete:  Yes


Emergency Medical Condition:  Yes


Differential Diagnosis


Ovarian cyst rupture vs. malingering vs. gastroparesis vs. opioid withdrawal


Narrative Course


35yo F with chronic pain here with complaint of abdominal pain that feels like 

her usual pain that is only relieved with dilaudid.  Pt given zofran, toradol 

for pain.  Pt is demanding dilaudid and has pain seeking behavior so will not 

give dilaudid. 





Labs reviewed, leukocytosis at 14.3 but improved from last visit.  H/H 

elevated.  Pt given NS IVF.  K is low at 2.7.  Pt given 60mEq KCl PO and had 

20mEq KCl IV ordered.  bHCG negative.  Magnesium 1gm IV given since K is low.  

Pt ripped out her IV and will not finish her evaluation because she is 

frustrated that she is not getting dilaudid.  Explain to her that work up has 

not been completed.  Pt is signing out against medical advice.  VS stable.  HR 

has decreased to 92bpm.  AMA: The risks of leaving against medical advice 

without further evaluation treatment were discussed with the patient.  These 

risks include cardiac dysfunction, cardiac dysrhythmia, possible heart attack, 

possible stroke or death.  The patient indicated understanding of these risks 

and appeared to have the capacity to make this decision.





Diagnosis





 Primary Impression:  


 Chronic abdominal pain


Patient Instructions:  General Instructions


Departure Forms:  Tests/Procedures





***Additional Instructions:


Please return to the ED if you change your mind.  Please follow up with PMD for 

chronic abdominal pain.


***Med/Other Pt SpecificInfo:  No Change to Meds


Disposition:  07 AGAINST MEDICAL ADVICE


Condition:  Stable








Maria Eugenia Hobson  Mar 3, 2017 07:17

## 2017-03-30 ENCOUNTER — HOSPITAL ENCOUNTER (EMERGENCY)
Dept: HOSPITAL 17 - PHED | Age: 37
Discharge: LEFT BEFORE DISCHARGE | End: 2017-03-30
Payer: MEDICAID

## 2017-03-30 VITALS
HEART RATE: 107 BPM | DIASTOLIC BLOOD PRESSURE: 101 MMHG | OXYGEN SATURATION: 100 % | TEMPERATURE: 98.3 F | SYSTOLIC BLOOD PRESSURE: 140 MMHG | RESPIRATION RATE: 18 BRPM

## 2017-03-30 VITALS — WEIGHT: 101.41 LBS | BODY MASS INDEX: 17.97 KG/M2 | HEIGHT: 63 IN

## 2017-03-30 DIAGNOSIS — R10.9: Primary | ICD-10-CM

## 2017-03-30 DIAGNOSIS — R11.2: ICD-10-CM

## 2017-03-30 DIAGNOSIS — N80.9: ICD-10-CM

## 2017-03-30 DIAGNOSIS — G89.29: ICD-10-CM

## 2017-03-30 PROCEDURE — 96374 THER/PROPH/DIAG INJ IV PUSH: CPT

## 2017-03-30 PROCEDURE — 96375 TX/PRO/DX INJ NEW DRUG ADDON: CPT

## 2017-03-30 PROCEDURE — 99284 EMERGENCY DEPT VISIT MOD MDM: CPT

## 2017-03-30 NOTE — PD
HPI


.


Abdominal and back pain


Chief Complaint:  GI Complaint


Time Seen by Provider:  07:52


Travel History


International Travel<30 days:  No


Contact w/Intl Traveler<30days:  No


Traveled to known affect area:  No





History of Present Illness


HPI


Patient presents complaining with chronic abdominal and back pain.  She reports 

a history of endometriosis and hurts all the time.  He states her symptoms have 

been worse for the last couple of weeks.  She reports nausea, vomiting, poor 

appetite area she denies fever.  She denies urinary tract symptoms.  She does 

admit to decreased urinary output.  She reports no exacerbating or relieving 

factors.  She states that she is currently on Provera oncologist and states 

that she is worse rather than better.





[id number]: Diffuse abdomen and back


SEVERITY: Severe


DURATION: Chronic


TIMING: Worse 2 weeks


CONTEXT: History of endometriosis


MODIFYING FACTORS: Exacerbated by Provera.  No relieving factors


ASSOCIATED SYMPTOMS: Nausea and vomiting





PFSH


Past Medical History


Hx Anticoagulant Therapy:  No


Arthritis:  No


Asthma:  No


Autoimmune Disease:  No


Blood Disorders:  No


Anxiety:  No


Depression:  No


Heart Rhythm Problems:  No


Cancer:  No


Cardiovascular Problems:  No


High Cholesterol:  No


Chemotherapy:  No


Chest Pain:  No


Congestive Heart Failure:  No


COPD:  No


Cerebrovascular Accident:  No


Diabetes:  No


Diminished Hearing:  No


Endocrine:  No


Gastrointestinal Disorders:  Yes (GASTROPARESIS)


GERD:  Yes


Glaucoma:  No


Genitourinary:  No


Headaches:  No


Hepatitis:  No


Hiatal Hernia:  Yes


Hypertension:  No


Immune Disorder:  No


Implanted Vascular Access Dvce:  No


Kidney Stones:  No


Musculoskeletal:  No


Neurologic:  Yes


Psychiatric:  No


Reproductive:  Yes (ENDIOMETRESIS)


Respiratory:  No


Immunizations Current:  No


Migraines:  No


Myocardial Infarction:  No


Radiation Therapy:  No


Seizures:  No


Sickle Cell Disease:  No


Sleep Apnea:  No


Thyroid Disease:  No


Ulcer:  Yes


Influenza Vaccination:  No


Pregnant?:  Not Pregnant


:  3


Para:  2


Miscarriage:  0


:  1


Ovarian Cysts:  Yes





Past Surgical History


Abdominal Surgery:  Yes (EXP LAP)


AICD:  No


Appendectomy:  No


Arteriovenous Shunt:  No


Cardiac Surgery:  No


 Section:  Yes


Cholecystectomy:  No


Ear Surgery:  No


Endocrine Surgery:  No


Eye Surgery:  No


Genitourinary Surgery:  Yes


Gynecologic Surgery:  Yes


Insulin Pump:  No


Joint Replacement:  No


Neurologic Surgery:  Yes


Oral Surgery:  No


Pacemaker:  No


Thoracic Surgery:  No


Tonsillectomy:  Yes


Other Surgery:  Yes (colon polyp rempoval)





Social History


Alcohol Use:  No


Tobacco Use:  Yes (1PPD)


Substance Use:  Yes (marijuana-LAST NIGHT)





Allergies-Medications


(Allergen,Severity, Reaction):  


Coded Allergies:  


     Amoxicillin (Verified  Allergy, Severe, VOMITING, 3/30/17)


     Morphine (Verified  Allergy, Severe, Hives , 3/30/17)


     Penicillin (Verified  Allergy, Severe, VOMITING, 3/30/17)


     Sulfa (Verified  Adverse Reaction, Severe, VOMITING, 3/30/17)


Reported Meds & Prescriptions





Reported Meds & Active Scripts


Active


Reported


Provera (Medroxyprogesterone Acetate) 2.5 Mg Tab 2.5 Mg PO DAILY


     Start day 21








Review of Systems


Except as stated in HPI:  all other systems reviewed are Neg


General / Constitutional:  No: Fever, Chills


Gastrointestinal:  Positive: Nausea, Vomiting, Abdominal Pain,  No: Diarrhea


Genitourinary:  Positive: Decreased Urinary Output,  No: Urgency, Frequency, 

Dysuria


Psychiatric:  Positive: Anxiety





Physical Exam


Narrative


GENERAL: This is a very thin woman who is writhing on the bed and whining.


SKIN: Warm and dry.


HEAD: Atraumatic. Normocephalic. 


EYES: Pupils equal and round. 


ENT: No nasal bleeding or discharge.  Mucous membranes pink and moist.


NECK: Trachea midline.  Neck supple.


CARDIOVASCULAR: Regular rate and rhythm.  Heart sounds normal.


RESPIRATORY: No accessory muscle use.  Lungs clear with full air movement 

throughout.


GASTROINTESTINAL: Abdomen soft, non-tender, nondistended. 


MUSCULOSKELETAL: No obvious deformities.   No edema. 


NEUROLOGICAL: Awake and alert. No obvious cranial nerve deficits.  Motor 

grossly within normal limits. Normal speech.


PSYCHIATRIC: Appropriate mood and affect; insight and judgment normal.





Data


Data


Last Documented VS





Vital Signs








  Date Time  Temp Pulse Resp B/P Pulse Ox O2 Delivery O2 Flow Rate FiO2


 


3/30/17 06:53 98.3 107 18 140/101 100   








Orders








 Basic Metabolic Panel (Bmp) (3/30/17 07:58)


Complete Blood Count With Diff (3/30/17 07:58)


Urinalysis - C+S If Indicated (3/30/17 07:58)


Iv Access Insert/Monitor (3/30/17 07:58)


Sodium Chlor 0.9% 1000 Ml Inj (Ns 1000 M (3/30/17 07:58)


Sodium Chloride 0.9% Flush (Ns Flush) (3/30/17 08:00)


Ketorolac Inj (Toradol Inj) (3/30/17 08:00)


Ed Urine Pregnancytest Poc (3/30/17 07:58)


Prochlorperazine Inj (Compazine Inj) (3/30/17 08:00)


Diphenhydramine Inj (Benadryl Inj) (3/30/17 08:00)











[facility]


Medical Decision Making


Medical Screen Exam Complete:  Yes


Emergency Medical Condition:  Yes


Medical Record Reviewed:  Yes (patient has a history of multiple previous 

visits to the emergency department for similar symptoms.  She has been 

previously labeled as drug-seeking behavior.  She reportedly demand Dilaudid 

and attempt intimidation of the physician.  She has left from here several 

times AMA when Dilaudid has been refused.)


Differential Diagnosis


Differential diagnosis of abdominal pain includes but is not limited to 

gastritis, pancreatitis, hepatitis, gastroenteritis, gallbladder disease, 

constipation, urinary retention, UTI, peptic ulcer disease, diverticulitis or 

appendicitis


Narrative Course


Patient presents with chronic abdominal and back pain secondary to 

endometriosis.  She actually has the appearance of a drug user.  She is very 

thin and malnourished appearing.  We did weigh her and she has not lost any 

weight since the last time that she was here.  I will give her some IV fluids 

and check some basic labs.  I have ordered Toradol, Compazine and Benadryl for 

her symptoms.  I do not plan to give her any narcotics.





9:25 AM


The patient requested to see me regarding her pain management.  I found her 

standing in the room doubled over.  She was requesting Dilaudid.  I explained 

to the patient that Dilaudid was not clinically indicated or her problem.  550 

time that I return to the desk, the patient had pulled out her IV and walked 

out of the department standing straight up walking very briskly.





Diagnosis





 Primary Impression:  


 Chronic abdominal pain


Disposition:   AGAINST MEDICAL ADVICE


Condition:  Stable








Ramandeep Ireland MD Mar 30, 2017 08:05

## 2017-04-01 ENCOUNTER — HOSPITAL ENCOUNTER (OUTPATIENT)
Dept: HOSPITAL 17 - NEPC | Age: 37
Setting detail: OBSERVATION
LOS: 1 days | Discharge: HOME | End: 2017-04-02
Attending: HOSPITALIST | Admitting: HOSPITALIST
Payer: MEDICAID

## 2017-04-01 VITALS — SYSTOLIC BLOOD PRESSURE: 136 MMHG | DIASTOLIC BLOOD PRESSURE: 72 MMHG

## 2017-04-01 VITALS
HEART RATE: 60 BPM | TEMPERATURE: 97.6 F | OXYGEN SATURATION: 98 % | DIASTOLIC BLOOD PRESSURE: 67 MMHG | RESPIRATION RATE: 20 BRPM | SYSTOLIC BLOOD PRESSURE: 105 MMHG

## 2017-04-01 VITALS — HEART RATE: 51 BPM

## 2017-04-01 VITALS
DIASTOLIC BLOOD PRESSURE: 98 MMHG | HEART RATE: 112 BPM | OXYGEN SATURATION: 99 % | RESPIRATION RATE: 14 BRPM | SYSTOLIC BLOOD PRESSURE: 136 MMHG | TEMPERATURE: 97.8 F

## 2017-04-01 VITALS
HEART RATE: 65 BPM | RESPIRATION RATE: 16 BRPM | SYSTOLIC BLOOD PRESSURE: 106 MMHG | DIASTOLIC BLOOD PRESSURE: 65 MMHG | OXYGEN SATURATION: 98 % | TEMPERATURE: 97.6 F

## 2017-04-01 VITALS
TEMPERATURE: 98.2 F | RESPIRATION RATE: 20 BRPM | HEART RATE: 64 BPM | OXYGEN SATURATION: 100 % | SYSTOLIC BLOOD PRESSURE: 133 MMHG | DIASTOLIC BLOOD PRESSURE: 85 MMHG

## 2017-04-01 VITALS
OXYGEN SATURATION: 97 % | RESPIRATION RATE: 16 BRPM | SYSTOLIC BLOOD PRESSURE: 126 MMHG | DIASTOLIC BLOOD PRESSURE: 70 MMHG | HEART RATE: 60 BPM

## 2017-04-01 VITALS — HEART RATE: 60 BPM

## 2017-04-01 DIAGNOSIS — Z88.2: ICD-10-CM

## 2017-04-01 DIAGNOSIS — Z88.1: ICD-10-CM

## 2017-04-01 DIAGNOSIS — R73.9: ICD-10-CM

## 2017-04-01 DIAGNOSIS — K58.9: ICD-10-CM

## 2017-04-01 DIAGNOSIS — Z88.5: ICD-10-CM

## 2017-04-01 DIAGNOSIS — E83.39: ICD-10-CM

## 2017-04-01 DIAGNOSIS — R21: ICD-10-CM

## 2017-04-01 DIAGNOSIS — E87.6: ICD-10-CM

## 2017-04-01 DIAGNOSIS — N17.9: ICD-10-CM

## 2017-04-01 DIAGNOSIS — Z88.0: ICD-10-CM

## 2017-04-01 DIAGNOSIS — K21.9: ICD-10-CM

## 2017-04-01 DIAGNOSIS — F17.200: ICD-10-CM

## 2017-04-01 DIAGNOSIS — K31.84: ICD-10-CM

## 2017-04-01 DIAGNOSIS — N80.9: ICD-10-CM

## 2017-04-01 DIAGNOSIS — G43.A0: Primary | ICD-10-CM

## 2017-04-01 LAB
ALP SERPL-CCNC: 70 U/L (ref 45–117)
ALT SERPL-CCNC: 16 U/L (ref 10–53)
AMPHETAMINE, URINE: (no result)
ANION GAP SERPL CALC-SCNC: 15 MEQ/L (ref 5–15)
ANION GAP SERPL CALC-SCNC: 9 MEQ/L (ref 5–15)
APTT BLD: 27.5 SEC (ref 24.3–30.1)
AST SERPL-CCNC: 11 U/L (ref 15–37)
BARBITURATES, URINE: (no result)
BASOPHILS # BLD AUTO: 0.1 TH/MM3 (ref 0–0.2)
BASOPHILS NFR BLD: 0.6 % (ref 0–2)
BILIRUB SERPL-MCNC: 0.7 MG/DL (ref 0.2–1)
BUN SERPL-MCNC: 16 MG/DL (ref 7–18)
BUN SERPL-MCNC: 24 MG/DL (ref 7–18)
CHLORIDE SERPL-SCNC: 92 MEQ/L (ref 98–107)
CHLORIDE SERPL-SCNC: 97 MEQ/L (ref 98–107)
COCAINE UR-MCNC: (no result) NG/ML
COLOR UR: YELLOW
COMMENT (UR): (no result)
CULTURE IF INDICATED: (no result)
EOSINOPHIL # BLD: 0 TH/MM3 (ref 0–0.4)
EOSINOPHIL NFR BLD: 0.1 % (ref 0–4)
ERYTHROCYTE [DISTWIDTH] IN BLOOD BY AUTOMATED COUNT: 13.6 % (ref 11.6–17.2)
GFR SERPLBLD BASED ON 1.73 SQ M-ARVRAT: 40 ML/MIN (ref 89–?)
GFR SERPLBLD BASED ON 1.73 SQ M-ARVRAT: 66 ML/MIN (ref 89–?)
GLUCOSE UR STRIP-MCNC: 300 MG/DL
HCO3 BLD-SCNC: 23.5 MEQ/L (ref 21–32)
HCO3 BLD-SCNC: 26.6 MEQ/L (ref 21–32)
HCT VFR BLD CALC: 48.6 % (ref 35–46)
HEMO FLAGS: (no result)
HGB UR QL STRIP: (no result)
HYALINE CASTS #/AREA URNS LPF: 58 /LPF
INR PPP: 1 RATIO
KETONES UR STRIP-MCNC: (no result) MG/DL
LYMPHOCYTES # BLD AUTO: 1.5 TH/MM3 (ref 1–4.8)
LYMPHOCYTES NFR BLD AUTO: 13.5 % (ref 9–44)
MAGNESIUM SERPL-MCNC: 2.2 MG/DL (ref 1.5–2.5)
MCH RBC QN AUTO: 32 PG (ref 27–34)
MCHC RBC AUTO-ENTMCNC: 35.2 % (ref 32–36)
MCV RBC AUTO: 90.9 FL (ref 80–100)
MONOCYTES NFR BLD: 4.7 % (ref 0–8)
MUCOUS THREADS #/AREA URNS LPF: (no result) /LPF
NEUTROPHILS # BLD AUTO: 9.1 TH/MM3 (ref 1.8–7.7)
NEUTROPHILS NFR BLD AUTO: 81.1 % (ref 16–70)
NITRITE UR QL STRIP: (no result)
PLATELET # BLD: 405 TH/MM3 (ref 150–450)
POTASSIUM SERPL-SCNC: 2.4 MEQ/L (ref 3.5–5.1)
POTASSIUM SERPL-SCNC: 2.5 MEQ/L (ref 3.5–5.1)
PROTHROMBIN TIME: 10.9 SEC (ref 9.8–11.6)
RBC # BLD AUTO: 5.34 MIL/MM3 (ref 4–5.3)
SODIUM SERPL-SCNC: 130 MEQ/L (ref 136–145)
SODIUM SERPL-SCNC: 133 MEQ/L (ref 136–145)
SP GR UR STRIP: 1.03 (ref 1–1.03)
SQUAMOUS #/AREA URNS HPF: 2 /HPF (ref 0–5)
WBC # BLD AUTO: 11.2 TH/MM3 (ref 4–11)

## 2017-04-01 PROCEDURE — 84300 ASSAY OF URINE SODIUM: CPT

## 2017-04-01 PROCEDURE — 82570 ASSAY OF URINE CREATININE: CPT

## 2017-04-01 PROCEDURE — 80048 BASIC METABOLIC PNL TOTAL CA: CPT

## 2017-04-01 PROCEDURE — 83735 ASSAY OF MAGNESIUM: CPT

## 2017-04-01 PROCEDURE — 99285 EMERGENCY DEPT VISIT HI MDM: CPT

## 2017-04-01 PROCEDURE — C9113 INJ PANTOPRAZOLE SODIUM, VIA: HCPCS

## 2017-04-01 PROCEDURE — 74176 CT ABD & PELVIS W/O CONTRAST: CPT

## 2017-04-01 PROCEDURE — 85730 THROMBOPLASTIN TIME PARTIAL: CPT

## 2017-04-01 PROCEDURE — G0378 HOSPITAL OBSERVATION PER HR: HCPCS

## 2017-04-01 PROCEDURE — 96361 HYDRATE IV INFUSION ADD-ON: CPT

## 2017-04-01 PROCEDURE — 85610 PROTHROMBIN TIME: CPT

## 2017-04-01 PROCEDURE — 81001 URINALYSIS AUTO W/SCOPE: CPT

## 2017-04-01 PROCEDURE — 85025 COMPLETE CBC W/AUTO DIFF WBC: CPT

## 2017-04-01 PROCEDURE — 80053 COMPREHEN METABOLIC PANEL: CPT

## 2017-04-01 PROCEDURE — 84100 ASSAY OF PHOSPHORUS: CPT

## 2017-04-01 PROCEDURE — 80307 DRUG TEST PRSMV CHEM ANLYZR: CPT

## 2017-04-01 PROCEDURE — 96374 THER/PROPH/DIAG INJ IV PUSH: CPT

## 2017-04-01 PROCEDURE — 96375 TX/PRO/DX INJ NEW DRUG ADDON: CPT

## 2017-04-01 PROCEDURE — 84132 ASSAY OF SERUM POTASSIUM: CPT

## 2017-04-01 PROCEDURE — 83036 HEMOGLOBIN GLYCOSYLATED A1C: CPT

## 2017-04-01 PROCEDURE — 83690 ASSAY OF LIPASE: CPT

## 2017-04-01 PROCEDURE — 82948 REAGENT STRIP/BLOOD GLUCOSE: CPT

## 2017-04-01 RX ADMIN — HYDROMORPHONE HYDROCHLORIDE PRN MG: 1 INJECTION, SOLUTION INTRAMUSCULAR; INTRAVENOUS; SUBCUTANEOUS at 18:35

## 2017-04-01 RX ADMIN — SODIUM CHLORIDE SCH MG: 900 INJECTION, SOLUTION INTRAVENOUS at 17:00

## 2017-04-01 RX ADMIN — DOCUSATE SODIUM SCH MG: 100 CAPSULE, LIQUID FILLED ORAL at 14:13

## 2017-04-01 RX ADMIN — INSULIN ASPART SCH: 100 INJECTION, SOLUTION INTRAVENOUS; SUBCUTANEOUS at 16:00

## 2017-04-01 RX ADMIN — POTASSIUM CHLORIDE SCH MLS/HR: 10 INJECTION, SOLUTION INTRAVENOUS at 23:43

## 2017-04-01 RX ADMIN — DOCUSATE SODIUM SCH MG: 100 CAPSULE, LIQUID FILLED ORAL at 21:17

## 2017-04-01 RX ADMIN — Medication SCH ML: at 21:00

## 2017-04-01 RX ADMIN — HYDROMORPHONE HYDROCHLORIDE PRN MG: 1 INJECTION, SOLUTION INTRAMUSCULAR; INTRAVENOUS; SUBCUTANEOUS at 14:10

## 2017-04-01 RX ADMIN — INSULIN ASPART SCH: 100 INJECTION, SOLUTION INTRAVENOUS; SUBCUTANEOUS at 21:00

## 2017-04-01 RX ADMIN — ONDANSETRON PRN MG: 2 INJECTION, SOLUTION INTRAMUSCULAR; INTRAVENOUS at 16:28

## 2017-04-01 RX ADMIN — POTASSIUM CHLORIDE SCH MLS/HR: 200 INJECTION, SOLUTION INTRAVENOUS at 12:24

## 2017-04-01 RX ADMIN — POTASSIUM CHLORIDE AND SODIUM CHLORIDE SCH MLS/HR: 900; 300 INJECTION, SOLUTION INTRAVENOUS at 16:39

## 2017-04-01 RX ADMIN — POTASSIUM CHLORIDE SCH MLS/HR: 10 INJECTION, SOLUTION INTRAVENOUS at 18:53

## 2017-04-01 RX ADMIN — POTASSIUM CHLORIDE SCH MLS/HR: 200 INJECTION, SOLUTION INTRAVENOUS at 09:58
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
## 2017-04-01 NOTE — RADRPT
EXAM DATE/TIME:  04/01/2017 09:41 

 

[facility] COMPARISON:     CT ABDOMEN & PELVIS W/O CONTRAST, June 16, 2016, 8:34.

 

INDICATIONS :     Bilateral flank pain, nausea, vomiting.

                  

ORAL CONTRAST:      No oral contrast ingested.

                  

RADIATION DOSE:     13.28 CTDIvol (mGy) 

 

MEDICAL HISTORY :     Hernia, hiatal. Gastroparesis. Endometriosis.

SURGICAL HISTORY :       Uterine ablation.

ENCOUNTER:      Initial

ACUITY:      4 - 6 days

PAIN SCALE:      4/10

LOCATION:       Bilateral flank 

 

TECHNIQUE:     Volumetric scanning of the abdomen and pelvis was performed.  Using automated exposure
 control and adjustment of the mA and/or kV according to patient size, radiation dose was kept as low
 as reasonably achievable to obtain optimal diagnostic quality images. 

 

FINDINGS:     

The lung bases are clear.  Liver, spleen, pancreas and adrenal glands are unremarkable.  There is no 
evidence for a renal stone. 

 

In the pelvis, there is a 2.6 cm cystic mass in the right adnexal region.  

 

Phleboliths are present in the pelvis.  There is no adenopathy.

 

Bowel gas pattern is remarkable only for air throughout the colon.

 

CONCLUSION:     

1.  There is no evidence for obstructing stone. 

2.  Cystic mass in the right adnexal region as described above.

3.  There is no free fluid. 

 

 Sherwin White MD FACR on April 01, 2017 at 10:33                

Board Certified Radiologist.

 This report was verified electronically.
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
## 2017-04-02 VITALS
SYSTOLIC BLOOD PRESSURE: 100 MMHG | HEART RATE: 55 BPM | TEMPERATURE: 98 F | DIASTOLIC BLOOD PRESSURE: 57 MMHG | OXYGEN SATURATION: 100 % | RESPIRATION RATE: 18 BRPM

## 2017-04-02 VITALS
HEART RATE: 52 BPM | TEMPERATURE: 97.7 F | OXYGEN SATURATION: 98 % | SYSTOLIC BLOOD PRESSURE: 102 MMHG | DIASTOLIC BLOOD PRESSURE: 55 MMHG | RESPIRATION RATE: 20 BRPM

## 2017-04-02 VITALS
HEART RATE: 50 BPM | OXYGEN SATURATION: 99 % | SYSTOLIC BLOOD PRESSURE: 105 MMHG | TEMPERATURE: 98 F | DIASTOLIC BLOOD PRESSURE: 65 MMHG | RESPIRATION RATE: 20 BRPM

## 2017-04-02 VITALS
HEART RATE: 50 BPM | DIASTOLIC BLOOD PRESSURE: 57 MMHG | OXYGEN SATURATION: 99 % | TEMPERATURE: 97.5 F | RESPIRATION RATE: 20 BRPM | SYSTOLIC BLOOD PRESSURE: 107 MMHG

## 2017-04-02 VITALS — HEART RATE: 50 BPM

## 2017-04-02 LAB
ALP SERPL-CCNC: 48 U/L (ref 45–117)
ALT SERPL-CCNC: 12 U/L (ref 10–53)
ANION GAP SERPL CALC-SCNC: 9 MEQ/L (ref 5–15)
AST SERPL-CCNC: 16 U/L (ref 15–37)
BASOPHILS # BLD AUTO: 0 TH/MM3 (ref 0–0.2)
BASOPHILS NFR BLD: 0.3 % (ref 0–2)
BILIRUB SERPL-MCNC: 0.3 MG/DL (ref 0.2–1)
BUN SERPL-MCNC: 11 MG/DL (ref 7–18)
CHLORIDE SERPL-SCNC: 108 MEQ/L (ref 98–107)
EOSINOPHIL # BLD: 0.2 TH/MM3 (ref 0–0.4)
EOSINOPHIL NFR BLD: 2.3 % (ref 0–4)
ERYTHROCYTE [DISTWIDTH] IN BLOOD BY AUTOMATED COUNT: 13.3 % (ref 11.6–17.2)
GFR SERPLBLD BASED ON 1.73 SQ M-ARVRAT: 124 ML/MIN (ref 89–?)
HCO3 BLD-SCNC: 20.4 MEQ/L (ref 21–32)
HCT VFR BLD CALC: 34 % (ref 35–46)
HEMO FLAGS: (no result)
LYMPHOCYTES # BLD AUTO: 3.1 TH/MM3 (ref 1–4.8)
LYMPHOCYTES NFR BLD AUTO: 40.4 % (ref 9–44)
MCH RBC QN AUTO: 32 PG (ref 27–34)
MCHC RBC AUTO-ENTMCNC: 35.5 % (ref 32–36)
MCV RBC AUTO: 90 FL (ref 80–100)
MONOCYTES NFR BLD: 9.4 % (ref 0–8)
NEUTROPHILS # BLD AUTO: 3.6 TH/MM3 (ref 1.8–7.7)
NEUTROPHILS NFR BLD AUTO: 47.6 % (ref 16–70)
PLATELET # BLD: 236 TH/MM3 (ref 150–450)
POTASSIUM SERPL-SCNC: 5.6 MEQ/L (ref 3.5–5.1)
RBC # BLD AUTO: 3.78 MIL/MM3 (ref 4–5.3)
SODIUM SERPL-SCNC: 137 MEQ/L (ref 136–145)
WBC # BLD AUTO: 7.6 TH/MM3 (ref 4–11)

## 2017-04-02 RX ADMIN — INSULIN ASPART SCH: 100 INJECTION, SOLUTION INTRAVENOUS; SUBCUTANEOUS at 10:53

## 2017-04-02 RX ADMIN — SODIUM CHLORIDE SCH MG: 900 INJECTION, SOLUTION INTRAVENOUS at 08:18

## 2017-04-02 RX ADMIN — POTASSIUM CHLORIDE SCH MLS/HR: 10 INJECTION, SOLUTION INTRAVENOUS at 03:22

## 2017-04-02 RX ADMIN — POTASSIUM CHLORIDE AND SODIUM CHLORIDE SCH MLS/HR: 900; 300 INJECTION, SOLUTION INTRAVENOUS at 03:21

## 2017-04-02 RX ADMIN — Medication SCH ML: at 08:18

## 2017-04-02 RX ADMIN — ONDANSETRON PRN MG: 2 INJECTION, SOLUTION INTRAMUSCULAR; INTRAVENOUS at 01:03

## 2017-04-02 RX ADMIN — DOCUSATE SODIUM SCH MG: 100 CAPSULE, LIQUID FILLED ORAL at 08:17

## 2017-04-02 RX ADMIN — HYDROMORPHONE HYDROCHLORIDE PRN MG: 1 INJECTION, SOLUTION INTRAMUSCULAR; INTRAVENOUS; SUBCUTANEOUS at 08:18

## 2017-04-02 RX ADMIN — HYDROMORPHONE HYDROCHLORIDE PRN MG: 1 INJECTION, SOLUTION INTRAMUSCULAR; INTRAVENOUS; SUBCUTANEOUS at 01:04

## 2017-04-02 RX ADMIN — INSULIN ASPART SCH: 100 INJECTION, SOLUTION INTRAVENOUS; SUBCUTANEOUS at 05:37

## 2017-04-02 RX ADMIN — SODIUM CHLORIDE SCH MG: 900 INJECTION, SOLUTION INTRAVENOUS at 12:00

## 2017-04-02 NOTE — HHI.PR
Subjective


Remarks


Follow-up for abdominal pain.  Feels much better today.  Patient still has some 

abdominal discomfort, states it is significantly improved.  Reports abdominal 

pain is generalized.  She's had no nausea or vomiting and is tolerating full 

diet.  Ambulating.





Objective


Vitals





 Vital Signs








  Date Time  Temp Pulse Resp B/P Pulse Ox O2 Delivery O2 Flow Rate FiO2


 


4/2/17 08:00  50      


 


4/2/17 07:01 98.0 50 20 105/65 99   


 


4/2/17 03:33 97.5 50 20 107/57 99   


 


4/2/17 00:14 97.7 52 20 102/55 98   


 


4/1/17 20:00  60      


 


4/1/17 19:14 97.6 60 20 105/67 98   


 


4/1/17 15:52 98.2 64 20 133/85 100   


 


4/1/17 15:36  51      


 


4/1/17 14:52  58 18 136/72 99   


 


4/1/17 12:25  60 16 126/70 97 Room Air  








 I/O








 4/1/17 4/1/17 4/1/17 4/2/17 4/2/17 4/2/17





 07:00 15:00 23:00 07:00 15:00 23:00


 


Intake Total    1655 ml  


 


Balance    1655 ml  


 


      


 


Intake IV Total    1655 ml  


 


# Voids   1 3  








Result Diagram:  


4/2/17 0513                                                                    

            4/2/17 0513





Imaging





Last Impressions








Abdomen/Pelvis CT 4/1/17 0000 Signed





Impressions: 





 Service Date/Time:  Saturday, April 1, 2017 09:41 - CONCLUSION:  1.  There is 

no 





 evidence for obstructing stone.  2.  Cystic mass in the right adnexal region 

as 





 described above. 3.  There is no free fluid.    Sherwin White MD  FACR








Objective Remarks


GENERAL: Well-developed well-nourished.  In no acute distress.


SKIN: Warm and dry. Dark discoloration of lower back with evidence of livedo 

reticularis.


HEENT: Normocephalic. Pupils equal and round. Mucous membranes pink and moist. 


CARDIOVASCULAR: Regular rate and rhythm.  No murmur appreciated.


RESPIRATORY: No accessory muscle use. Clear to auscultation. Breath sounds 

equal bilaterally. 


GASTROINTESTINAL: Abdomen soft, non-tender, nondistended. Bowel sounds x4.


MUSCULOSKELETAL: No obvious deformities. No clubbing or cyanosis. No edema. 


NEUROLOGICAL: Awake and alert. No focal neurological deficits.  Moves upper and 

lower extremities spontaneously. Normal speech.


PSYCHIATRIC: Appropriate mood and affect; insight and judgment normal.





A/P


Assessment and Plan


36-year-old female with past medical history endometriosis, IBS, gastroparesis, 

cyclic vomiting, and ovarian cyst presents with abdominal pain





Cyclic vomiting/centimeters/abdominal pain: Acute on chronic.  Abdominal CT 

shows right adnexal cystic mass, otherwise no acute process. May be secondary 

to diabetic gastroparesis vs endometriosis.  Antiemetics as needed with Reglan 

scheduled.  IVF.  IV PPI.  Oral and intravenous narcotics as needed for pain.  

Diet as tolerated, added ensure. Hysterectomy at [facility] as scheduled. 





Acute kidney injury: Creatinine 1.47.  Baseline is less than 1.  UA with 

proteinuria, glucosuria, ketonuria, urobilinogen.  IVF.  Urine sodium and 

creatinine reviewed.  Follow-up BMP after IVF shows creatinine trending down to 

0.55.  Resolved.





Hypokalemia: Potassium 2.4 on hemolyzed sample.  From decreased intake and 

vomiting.  Repeat potassium 2.5.  Given additional IV potassium bolus as well 

as maintenance.  Magnesium within normal limits.  Repeat potassium level this 

morning 5.6 on hemolyzed sample, will recheck.  Hold additional supplementation 

for now.





Hyperglycemia: Previous hemoglobin A1c was 5.0 on 9/20/16.  Blood glucose 237 

hemolyzed blood sample, repeat 107.  UA with glycosuria and proteinuria.  

Following Accu-Cheks and covering with sliding scale insulin, has not required 

any insulin.  Hemoglobin A1c pending.





Hyperpigmented rash:  May be secondary to autoimmune process. Outpt follow-up.





Hypophosphatemia: Replace with potassium phosphorus if potassium within normal 

limits.





DVT prophylaxis: SCDs


Discharge Planning


Follow-up repeat potassium level, if improved, possible discharge later today 

with pain medication and plans for outpatient follow-up.








Cristi Vazquez Apr 2, 2017 10:39

## 2017-04-02 NOTE — HHI.DS
__________________________________________________





Discharge Summary


Admission Date


Apr 1, 2017 at 11:29


Discharge Date:  Apr 2, 2017


Admitting Diagnosis


hypokalemia, nausea and vomiting, abdominal pain





(1) Endometriosis


ICD Code:  N80.9


(2) Hypokalemia


ICD Code:  E87.6


(3) Nausea & vomiting


ICD Code:  R11.2


(4) Gastroparesis


ICD Code:  K31.84


Procedures


None


Brief History - From Admission


36-year-old female with past medical history endometriosis, IBS, gastroparesis, 

cyclic vomiting, and ovarian cyst presents with abdominal pain.  The patient 

states that once a month she gets the symptoms of abdominal pain and 

intractable vomiting.  The symptoms has been present since Wednesday.  She no 

longer has a period because she had an endometrial ablation.  She locates the 

abdominal pain worse in her suprapubic area and in her right lower back.  She 

also has some epigastric discomfort as well.  She has an area keep down any 

solid food, has been tolerating fluids and some broth.  She denies any 

hematemesis.  She denies any diarrhea, constipation, dysuria, fever, shortness 

breath, chest pain.  Ambulating with no difficulties, although she feels like 

her legs are heavy.  She states she's been told that her symptoms are related 

to endometriosis, and the plan is to follow up with [facility] next week for 

hysterectomy.


CBC/BMP:  


4/2/17 0513                                                                    

            4/2/17 1031





Significant Findings





Laboratory Tests








Test 4/1/17 4/1/17 4/1/17 4/2/17





 08:40 09:15 17:13 05:13


 


White Blood Count 11.2 TH/MM3   





 (4.0-11.0)   


 


Red Blood Count 5.34 MIL/MM3   3.78 MIL/MM3





 (4.00-5.30)   (4.00-5.30)


 


Hemoglobin 17.1 GM/DL   





 (11.6-15.3)   


 


Hematocrit 48.6 %   34.0 %





 (35.0-46.0)   (35.0-46.0)


 


Neutrophils (%) (Auto) 81.1 %   





 (16.0-70.0)   


 


Neutrophils # (Auto) 9.1 TH/MM3   





 (1.8-7.7)   


 


Urine Turbidity HAZY  (CLEAR)   


 


Urine Protein 300 mg/dL   





 (NEG-TRACE)   


 


Urine Glucose (UA) 300 mg/dL (NEG)   


 


Urine Ketones TRACE mg/dL   





 (NEG)   


 


Urine Occult Blood SMALL  (NEG)   


 


Urine Urobilinogen 4.0 MG/DL   





 (LESS THAN 2.0)   


 


Urine Mucus MANY /lpf (OCC)   


 


Urine Cannabinoids Screen POS  (NEG)   


 


Sodium Level  130 MEQ/L 133 MEQ/L 





  (136-145) (136-145) 


 


Potassium Level  2.4 MEQ/L 2.5 MEQ/L 5.6 MEQ/L





  (3.5-5.1) (3.5-5.1) (3.5-5.1)


 


Chloride Level  92 MEQ/L 97 MEQ/L 108 MEQ/L





  () () ()


 


Blood Urea Nitrogen  24 MG/DL (7-18)  


 


Creatinine  1.47 MG/DL  





  (0.50-1.00)  


 


Estimat Glomerular Filtration  40 ML/MIN (>89) 66 ML/MIN (>89) 





Rate    


 


Random Glucose  237 MG/ MG/DL 





  () () 


 


Calcium Level  10.5 MG/DL  7.9 MG/DL





  (8.5-10.1)  (8.5-10.1)


 


Magnesium Level  2.7 MG/DL  





  (1.5-2.5)  


 


Aspartate Amino Transf  11 U/L (15-37)  





(AST/SGOT)    


 


Total Protein  9.2 GM/DL  5.4 GM/DL





  (6.4-8.2)  (6.4-8.2)


 


Albumin  5.1 GM/DL  3.2 GM/DL





  (3.4-5.0)  (3.4-5.0)


 


Phosphorus Level   1.6 MG/DL 





   (2.5-4.9) 


 


Monocytes (%) (Auto)    9.4 % (0.0-8.0)


 


Carbon Dioxide Level    20.4 MEQ/L





    (21.0-32.0)








Imaging





Last Impressions








Abdomen/Pelvis CT 4/1/17 0000 Signed





Impressions: 





 Service Date/Time:  Saturday, April 1, 2017 09:41 - CONCLUSION:  1.  There is 

no 





 evidence for obstructing stone.  2.  Cystic mass in the right adnexal region 

as 





 described above. 3.  There is no free fluid.    Sherwin White MD  FACR








PE at Discharge


GENERAL: Well-developed well-nourished.  In no acute distress.


SKIN: Warm and dry. Dark discoloration of lower back with evidence of livedo 

reticularis.


HEENT: Normocephalic. Pupils equal and round. Mucous membranes pink and moist. 


CARDIOVASCULAR: Regular rate and rhythm.  No murmur appreciated.


RESPIRATORY: No accessory muscle use. Clear to auscultation. Breath sounds 

equal bilaterally. 


GASTROINTESTINAL: Abdomen soft, non-tender, nondistended. Bowel sounds x4.


MUSCULOSKELETAL: No obvious deformities. No clubbing or cyanosis. No edema. 


NEUROLOGICAL: Awake and alert. No focal neurological deficits.  Moves upper and 

lower extremities spontaneously. Normal speech.


PSYCHIATRIC: Appropriate mood and affect; insight and judgment normal.


Hospital Course


36-year-old female with past medical history endometriosis, IBS, gastroparesis, 

cyclic vomiting, and ovarian cyst presents with abdominal pain





Cyclic vomiting/centimeters/abdominal pain: Acute on chronic.  Abdominal CT 

shows right adnexal cystic mass, otherwise no acute process. May be secondary 

to diabetic gastroparesis vs endometriosis.  Antiemetics as needed with Reglan 

scheduled.  IVF.  IV PPI.  Oral and intravenous narcotics as needed for pain.  

Diet as tolerated, added ensure. Hysterectomy at [facility] as scheduled. D/c on 

PPI and oxycodone. Low residue diet.





Acute kidney injury: Creatinine 1.47.  Baseline is less than 1.  UA with 

proteinuria, glucosuria, ketonuria, urobilinogen.  IVF.  Urine sodium and 

creatinine reviewed.  Follow-up BMP after IVF shows creatinine trending down to 

0.55.  Resolved.





Hypokalemia/ Hypophosphatemia: Potassium 2.4 on hemolyzed sample.  From 

decreased intake and vomiting.  Repeat potassium 2.5.  Given additional IV 

potassium bolus as well as maintenance.  Magnesium within normal limits.  

Repeat potassium level this morning 5.6 on hemolyzed sample, will recheck.  

Hold additional supplementation for now. Repeat within normal limits. Pt will 

be discharged on potassium and phosphorous supplements.





Hyperglycemia: Previous hemoglobin A1c was 5.0 on 9/20/16.  Blood glucose 237 

hemolyzed blood sample, repeat 107.  UA with glycosuria and proteinuria.  

Following Accu-Cheks and covering with sliding scale insulin, has not required 

any insulin.  Hemoglobin A1c normal. Outpt follow-up with PCP for glycosuria 

and proteinuria.





Hyperpigmented rash:  May be secondary to autoimmune process. Outpt follow-up.





DVT prophylaxis: SCDs


Pt Condition on Discharge:  Good


Discharge Disposition:  Discharge Home


Discharge Time:  <= 30 minutes


Discharge Instructions


DIET: Follow Instructions for:  Low Residue Diet


Activities you can perform:  Regular-No Restrictions


Follow up Referrals:  


OB/GYN


PCP Follow-up - 1 Week





New Orders:  


BASIC METABOLIC PROF - 3-5 Days





New Medications:  


Pantoprazole (Pantoprazole) 40 Mg Tab


40 MG PO DAILY Reflux #30 Ref 0 TAB


Potassium Chloride ER (Potassium Chloride ER) 20 Meq Tab


20 MEQ PO DAILY Electrolyte Replacement #7 Ref 0 TAB


Potassium Phosphate Monobasic (K-Phos) 500 Mg Tab


500 MG PO PCHS Electrolyte Replacement #30 Ref 0 TAB


Oxycodone (Oxycodone) 5 Mg Tab


5 MG PO Q4H PRN pain #24 TAB


 


Continued Medications:  


Medroxyprogesterone Acetate (Provera) 2.5 Mg Tab


2.5 MG PO DAILY Start day 21 Uterine bleeding #5 TAB











Lavon Fregoso DO Apr 2, 2017 12:51

## 2017-05-23 ENCOUNTER — HOSPITAL ENCOUNTER (EMERGENCY)
Dept: HOSPITAL 17 - NEPD | Age: 37
Discharge: HOME | End: 2017-05-23
Payer: MEDICAID

## 2017-05-23 VITALS — WEIGHT: 105.82 LBS | BODY MASS INDEX: 18.75 KG/M2 | HEIGHT: 63 IN

## 2017-05-23 VITALS
RESPIRATION RATE: 16 BRPM | TEMPERATURE: 98 F | HEART RATE: 108 BPM | SYSTOLIC BLOOD PRESSURE: 125 MMHG | DIASTOLIC BLOOD PRESSURE: 84 MMHG | OXYGEN SATURATION: 98 %

## 2017-05-23 DIAGNOSIS — F12.10: ICD-10-CM

## 2017-05-23 DIAGNOSIS — R10.9: Primary | ICD-10-CM

## 2017-05-23 DIAGNOSIS — F17.210: ICD-10-CM

## 2017-05-23 DIAGNOSIS — N39.0: ICD-10-CM

## 2017-05-23 LAB
ALP SERPL-CCNC: 70 U/L (ref 45–117)
ALT SERPL-CCNC: 19 U/L (ref 10–53)
AMPHETAMINE, URINE: (no result)
ANION GAP SERPL CALC-SCNC: 14 MEQ/L (ref 5–15)
AST SERPL-CCNC: 10 U/L (ref 15–37)
BACTERIA #/AREA URNS HPF: (no result) /HPF
BARBITURATES, URINE: (no result)
BASOPHILS # BLD AUTO: 0.2 TH/MM3 (ref 0–0.2)
BASOPHILS NFR BLD: 1.4 % (ref 0–2)
BILIRUB SERPL-MCNC: 0.5 MG/DL (ref 0.2–1)
BUN SERPL-MCNC: 18 MG/DL (ref 7–18)
CHLORIDE SERPL-SCNC: 101 MEQ/L (ref 98–107)
COCAINE UR-MCNC: (no result) NG/ML
COLOR UR: (no result)
COMMENT (UR): (no result)
CULTURE IF INDICATED: (no result)
EOSINOPHIL # BLD: 0.1 TH/MM3 (ref 0–0.4)
EOSINOPHIL NFR BLD: 0.6 % (ref 0–4)
ERYTHROCYTE [DISTWIDTH] IN BLOOD BY AUTOMATED COUNT: 13.1 % (ref 11.6–17.2)
GFR SERPLBLD BASED ON 1.73 SQ M-ARVRAT: 58 ML/MIN (ref 89–?)
GLUCOSE UR STRIP-MCNC: (no result) MG/DL
HCO3 BLD-SCNC: 24.5 MEQ/L (ref 21–32)
HCT VFR BLD CALC: 50.7 % (ref 35–46)
HEMO FLAGS: (no result)
HGB UR QL STRIP: (no result)
KETONES UR STRIP-MCNC: (no result) MG/DL
LYMPHOCYTES # BLD AUTO: 2.3 TH/MM3 (ref 1–4.8)
LYMPHOCYTES NFR BLD AUTO: 18.7 % (ref 9–44)
MCH RBC QN AUTO: 31.1 PG (ref 27–34)
MCHC RBC AUTO-ENTMCNC: 33.5 % (ref 32–36)
MCV RBC AUTO: 93 FL (ref 80–100)
MONOCYTES NFR BLD: 6 % (ref 0–8)
MUCOUS THREADS #/AREA URNS LPF: (no result) /LPF
NEUTROPHILS # BLD AUTO: 9 TH/MM3 (ref 1.8–7.7)
NEUTROPHILS NFR BLD AUTO: 73.3 % (ref 16–70)
NITRITE UR QL STRIP: (no result)
PLATELET # BLD: 345 TH/MM3 (ref 150–450)
POTASSIUM SERPL-SCNC: 3.2 MEQ/L (ref 3.5–5.1)
RBC # BLD AUTO: 5.45 MIL/MM3 (ref 4–5.3)
SODIUM SERPL-SCNC: 139 MEQ/L (ref 136–145)
SP GR UR STRIP: 1.03 (ref 1–1.03)
SQUAMOUS #/AREA URNS HPF: 1 /HPF (ref 0–5)
WBC # BLD AUTO: 12.3 TH/MM3 (ref 4–11)

## 2017-05-23 PROCEDURE — 84703 CHORIONIC GONADOTROPIN ASSAY: CPT

## 2017-05-23 PROCEDURE — 96375 TX/PRO/DX INJ NEW DRUG ADDON: CPT

## 2017-05-23 PROCEDURE — 96365 THER/PROPH/DIAG IV INF INIT: CPT

## 2017-05-23 PROCEDURE — 74177 CT ABD & PELVIS W/CONTRAST: CPT

## 2017-05-23 PROCEDURE — 85025 COMPLETE CBC W/AUTO DIFF WBC: CPT

## 2017-05-23 PROCEDURE — 87086 URINE CULTURE/COLONY COUNT: CPT

## 2017-05-23 PROCEDURE — 99285 EMERGENCY DEPT VISIT HI MDM: CPT

## 2017-05-23 PROCEDURE — 81001 URINALYSIS AUTO W/SCOPE: CPT

## 2017-05-23 PROCEDURE — 80053 COMPREHEN METABOLIC PANEL: CPT

## 2017-05-23 PROCEDURE — 80307 DRUG TEST PRSMV CHEM ANLYZR: CPT

## 2017-05-23 NOTE — PD
HPI


.


Flank pain


Chief Complaint:  Pain: Acute or Chronic


Time Seen by Provider:  17:20


Travel History


International Travel<30 days:  No


Contact w/Intl Traveler<30days:  No


Traveled to known affect area:  No





History of Present Illness


HPI


Patient presents with a chief complaint of flank pain which started 4 days ago.

  She describes the pain as sharp and rates it as 10/10.  Patient denies any 

noted exacerbating or relieving factors.





The patient's history is significant for endometriosis, IBS, gastroparesis, 

cyclic vomiting, ovarian cyst.  Her records indicate that she was scheduled to 

have a hysterectomy in April.  This has not been done.





PFSH


Past Medical History


Hx Anticoagulant Therapy:  No


Arthritis:  No


Asthma:  No


Autoimmune Disease:  No


Blood Disorders:  No


Anxiety:  No


Depression:  No


Heart Rhythm Problems:  No


Cancer:  No


Cardiovascular Problems:  No


High Cholesterol:  No


Chemotherapy:  No


Chest Pain:  No


Congestive Heart Failure:  No


COPD:  No


Cerebrovascular Accident:  No


Diabetes:  No


Diminished Hearing:  No


Endocrine:  No


Gastrointestinal Disorders:  Yes (GASTROPARESIS)


GERD:  Yes


Glaucoma:  No


Genitourinary:  No


Headaches:  No


Hepatitis:  No


Hiatal Hernia:  Yes


Hypertension:  No


Immune Disorder:  No


Implanted Vascular Access Dvce:  No


Kidney Stones:  No


Musculoskeletal:  No


Neurologic:  Yes


Psychiatric:  No


Reproductive:  Yes (ENDIOMETRESIS)


Respiratory:  No


Immunizations Current:  No


Migraines:  No


Myocardial Infarction:  No


Radiation Therapy:  No


Seizures:  No


Sickle Cell Disease:  No


Sleep Apnea:  No


Thyroid Disease:  No


Ulcer:  Yes


Pregnant?:  Not Pregnant


LMP:  2016


:  3


Para:  2


Miscarriage:  0


:  1


Ovarian Cysts:  Yes





Past Surgical History


Abdominal Surgery:  Yes (EXP LAP)


AICD:  No


Appendectomy:  No


Arteriovenous Shunt:  No


Cardiac Surgery:  No


 Section:  Yes


Cholecystectomy:  No


Ear Surgery:  No


Endocrine Surgery:  No


Eye Surgery:  No


Genitourinary Surgery:  Yes


Gynecologic Surgery:  Yes


Insulin Pump:  No


Joint Replacement:  No


Neurologic Surgery:  Yes


Oral Surgery:  No


Pacemaker:  No


Thoracic Surgery:  No


Tonsillectomy:  Yes


Other Surgery:  Yes (colon polyp rempoval)





Social History


Alcohol Use:  No


Tobacco Use:  Yes (1PPD)


Substance Use:  Yes (marijuana-LAST NIGHT)





Allergies-Medications


(Allergen,Severity, Reaction):  


Coded Allergies:  


     Amoxicillin (Verified  Allergy, Severe, VOMITING, 17)


     Morphine (Verified  Allergy, Severe, Hives , 17)


     Penicillin (Verified  Allergy, Severe, VOMITING, 17)


     Sulfa (Verified  Adverse Reaction, Severe, VOMITING, 17)


Reported Meds & Prescriptions





Reported Meds & Active Scripts


Active


K-Phos (Potassium Phosphate Monobasic) 500 Mg Tab 500 Mg PO PCHS


Potassium Chloride ER (Potassium Chloride) 20 Meq Tab 20 Meq PO DAILY


Pantoprazole (Pantoprazole Sodium) 40 Mg Tab 40 Mg PO DAILY


Oxycodone (Oxycodone HCl) 5 Mg Tab 5 Mg PO Q4H PRN


Reported


Provera (Medroxyprogesterone Acetate) 2.5 Mg Tab 2.5 Mg PO DAILY


     Start day 21








Review of Systems


Except as stated in HPI:  all other systems reviewed are Neg


General / Constitutional:  No: Fever, Chills


Gastrointestinal:  Positive: Nausea, Vomiting, Abdominal Pain,  No: Diarrhea


Genitourinary:  Positive: Flank Pain,  No: Urgency, Frequency, Dysuria





Physical Exam


Narrative





 Vital Signs








  Date Time  Temp Pulse Resp B/P Pulse Ox O2 Delivery O2 Flow Rate FiO2


 


17 16:43 98.0 108 16 125/84 98   








GENERAL: Thin, chronically ill-appearing woman who is contorting her body into 

very unusual positions.  She has been whining continuously.


SKIN: Warm and dry.


HEAD: Atraumatic. Normocephalic. 


EYES: Pupils equal and round.  Extraocular movements are intact.


ENT: No nasal bleeding or discharge.  Mucous membranes pink and moist.


NECK: Trachea midline.  Neck is supple.


CARDIOVASCULAR: Regular rate and rhythm.  Heart sounds are normal.  Heart rate 

is 108.


RESPIRATORY: No accessory muscle use.  Lungs sound clear with good air movement 

throughout.


GASTROINTESTINAL: Abdomen soft.  Diffuse tenderness to palpation.  Bowel sounds 

present.  Nondistended. 


MUSCULOSKELETAL: No obvious deformities.   No edema.  The patient certainly has 

no limitation in movement.


NEUROLOGICAL: Awake and alert. No obvious cranial nerve deficits.  Motor 

grossly within normal limits. Normal speech.


PSYCHIATRIC: Hystrionic affect





Data


Data


Last Documented VS





Vital Signs








  Date Time  Temp Pulse Resp B/P Pulse Ox O2 Delivery O2 Flow Rate FiO2


 


17 16:43 98.0 108 16 125/84 98   








Orders





 Complete Blood Count With Diff (17 17:43)


Comprehensive Metabolic Panel (17 17:43)


Urinalysis - C+S If Indicated (17 17:43)


Iv Access Insert/Monitor (17 17:43)


Ondansetron Inj (Zofran Inj) (17 17:45)


Sodium Chloride 0.9% Flush (Ns Flush) (17 17:45)


Ketorolac Inj (Toradol Inj) (17 17:45)


Ed Urine Pregnancytest Poc (17 17:43)


Drug Screen, Random Urine (17 17:43)


Urine Culture (17 18:00)


Ct Abd/Pel W Iv Contrast(Rout) (17 18:36)


Ceftriaxone Inj (Rocephin Inj) (17 18:45)


Iohexol 350 Inj (Omnipaque 350 Inj) (17 19:11)





Labs





 Laboratory Tests








Test 17





 17:10 18:00 18:50


 


White Blood Count 12.3 TH/MM3  


 


Red Blood Count 5.45 MIL/MM3  


 


Hemoglobin 17.0 GM/DL  


 


Hematocrit 50.7 %  


 


Mean Corpuscular Volume 93.0 FL  


 


Mean Corpuscular Hemoglobin 31.1 PG  


 


Mean Corpuscular Hemoglobin 33.5 %  





Concent   


 


Red Cell Distribution Width 13.1 %  


 


Platelet Count 345 TH/MM3  


 


Mean Platelet Volume 8.3 FL  


 


Neutrophils (%) (Auto) 73.3 %  


 


Lymphocytes (%) (Auto) 18.7 %  


 


Monocytes (%) (Auto) 6.0 %  


 


Eosinophils (%) (Auto) 0.6 %  


 


Basophils (%) (Auto) 1.4 %  


 


Neutrophils # (Auto) 9.0 TH/MM3  


 


Lymphocytes # (Auto) 2.3 TH/MM3  


 


Monocytes # (Auto) 0.7 TH/MM3  


 


Eosinophils # (Auto) 0.1 TH/MM3  


 


Basophils # (Auto) 0.2 TH/MM3  


 


CBC Comment DIFF FINAL   


 


Differential Comment    


 


Urine Color  ORANGE  


 


Urine Turbidity  HAZY  


 


Urine pH  5.5  


 


Urine Specific Gravity  1.035  


 


Urine Protein  300 mg/dL 


 


Urine Glucose (UA)  TRACE mg/dL 


 


Urine Ketones  NEG mg/dL 


 


Urine Occult Blood  NEG  


 


Urine Nitrite  NEG  


 


Urine Bilirubin  NEG  


 


Urine Urobilinogen  4.0 MG/DL 


 


Urine Leukocyte Esterase  TRACE  


 


Urine RBC  2 /hpf 


 


Urine WBC  11 /hpf 


 


Urine Squamous Epithelial  1 /hpf 





Cells   


 


Urine Amorphous Sediment  RARE  


 


Urine Bacteria  RARE /hpf 


 


Urine Mucus  MANY /lpf 


 


Microscopic Urinalysis Comment  CULTURE 





  INDICATED 


 


Sodium Level   139 MEQ/L


 


Potassium Level   3.2 MEQ/L


 


Chloride Level   101 MEQ/L


 


Carbon Dioxide Level   24.5 MEQ/L


 


Anion Gap   14 MEQ/L


 


Blood Urea Nitrogen   18 MG/DL


 


Creatinine   1.06 MG/DL


 


Estimat Glomerular Filtration   58 ML/MIN





Rate   


 


Random Glucose   150 MG/DL


 


Calcium Level   10.3 MG/DL


 


Total Bilirubin   0.5 MG/DL


 


Aspartate Amino Transf   10 U/L





(AST/SGOT)   


 


Alanine Aminotransferase   19 U/L





(ALT/SGPT)   


 


Alkaline Phosphatase   70 U/L


 


Total Protein   9.1 GM/DL


 


Albumin   5.0 GM/DL











MDM


Medical Decision Making


Medical Screen Exam Complete:  Yes


Emergency Medical Condition:  Yes


Medical Record Reviewed:  Yes (patient is seen here very frequently.  She 

typically requests Dilaudid pain control.)


Differential Diagnosis


Differential diagnosis of abdominal pain includes but is not limited to 

gastritis, pancreatitis, hepatitis, gastroenteritis, gallbladder disease, 

constipation, urinary retention, UTI, peptic ulcer disease, diverticulitis or 

appendicitis


Narrative Course


Patient presents complaining with right flank pain.  She is very difficult to 

evaluate because of her demeanor.





Her urine does have rare bacteria, 11 white cells and trace leukocyte esterase.

  The nurse tells me that this is a catheterized sample.  She has been given 

Rocephin.





CT abd/pelvis:  Right adnexal cystic structure again identified which appears 

mildly more prominent than on the prior study. This is most consistent with an 

ovarian cyst there is a smaller adjacent cyst. There is a mildly nonspecific 

bowel gas pattern most characteristic of an ileus. 





CBC Diagram


17 17:10

















BMP Diagram


17 18:50








The patient appears to have a UTI but no evidence of pyelonephritis.  No 

evidence of kidney stone.  She has been given a dose of Rocephin and will be 

discharged on Macrobid.  If she needs narcotic pain medications, she will need 

to see her primary care physician.  Narcotics are not indicated for a simple 

UTI.





Diagnosis





 Primary Impression:  


 Right flank pain


 Additional Impression:  


 Urinary tract infection


 Qualified Code:  N30.00 - Acute cystitis without hematuria


Patient Instructions:  General Instructions, Urinary Tract Infection in Women (

DC)


***Med/Other Pt SpecificInfo:  Prescription(s) given


Scripts


Nitrofurantoin Monohydrate Macrocrystals (Macrobid)100 Mg Dxm613 Mg PO BID  7 

Days  Ref 0


   Prov:Ramandeep Ireland MD         17


Disposition:  01 DISCHARGE HOME


Condition:  Stable








Ramandeep Ireland MD May 23, 2017 18:43

## 2017-05-23 NOTE — RADRPT
EXAM DATE/TIME:  05/23/2017 19:03 

 

[facility] COMPARISON:     

CT ABDOMEN & PELVIS W CONTRAST, February 17, 2017, 7:28.

 

 

INDICATIONS :     

Abdomen and back pain with vomiting.

                      

 

IV CONTRAST:     

70 cc Omnipaque 350 (iohexol) IV 

 

 

ORAL CONTRAST:      

No oral contrast ingested.

                      

 

RADIATION DOSE:     

6.88 CTDIvol (mGy) 

 

 

MEDICAL HISTORY :     

Gastroparesis.  

 

SURGICAL HISTORY :      

Hysterectomy. 

 

ENCOUNTER:      

Initial

 

ACUITY:      

1 day

 

PAIN SCALE:      

10/10

 

LOCATION:       

Bilateral  abdomen and back

 

TECHNIQUE:     

Volumetric scanning of the abdomen and pelvis was performed.  Using automated exposure control and ad
justment of the mA and/or kV according to patient size, radiation dose was kept as low as reasonably 
achievable to obtain optimal diagnostic quality images. 

 

FINDINGS:     

 

LOWER LUNGS:     

The visualized lower lungs are clear.

 

LIVER:     

Homogeneous density without lesion.  There is no dilation of the biliary tree.  No calcified gallston
es.

 

SPLEEN:     

Normal size without lesion.

 

PANCREAS:     

Within normal limits.

 

KIDNEYS:     

Normal in size and shape.  There is no mass, stone or hydronephrosis.

 

ADRENAL GLANDS:     

Within normal limits.

 

VASCULAR:     

There is no aortic aneurysm.

 

BOWEL/MESENTERY:     

No oral contrast was given limiting the sensitivity of the exam. There is a mildly nonspecific bowel 
gas pattern with several loops of nondilated air-containing small bowel several small air-fluid level
s. There is no free air or fluid. Gas and stool are noted segmentally in the colon. 

 

ABDOMINAL WALL:     

Within normal limits.

 

RETROPERITONEUM:     

There is no lymphadenopathy. 

 

BLADDER:     

No wall thickening or mass. 

 

REPRODUCTIVE:     

A right adnexal cystic lesion is again noted which now measures 2.5 x 2.8 cm in diameter. On the prio
r study this measured up to 2.2 x 2 cm. This measures approximately 2 Hounsfield units in density. Th
ere is a smaller adjacent cyst as well. The uterus and left adnexa are unremarkable.

 

INGUINAL:     

There is no lymphadenopathy or hernia. 

 

MUSCULOSKELETAL:     

Within normal limits for patient age. 

 

CONCLUSION:     Right adnexal cystic structure again identified which appears mildly more prominent t
[place] on the prior study. This is most consistent with an ovarian cyst there is a smaller adjacent cyst
. There is a mildly nonspecific bowel gas pattern most characteristic of an ileus. 

 

 

 Lavon Mock MD on May 23, 2017 at 19:18           

Board Certified Radiologist.

 This report was verified electronically.

## 2017-06-15 ENCOUNTER — HOSPITAL ENCOUNTER (EMERGENCY)
Dept: HOSPITAL 17 - PHED | Age: 37
Discharge: LEFT BEFORE DISCHARGE | End: 2017-06-15
Payer: MEDICAID

## 2017-06-15 ENCOUNTER — HOSPITAL ENCOUNTER (EMERGENCY)
Dept: HOSPITAL 17 - NEPD | Age: 37
Discharge: LEFT BEFORE DISCHARGE | End: 2017-06-15
Payer: MEDICAID

## 2017-06-15 VITALS
DIASTOLIC BLOOD PRESSURE: 91 MMHG | RESPIRATION RATE: 24 BRPM | HEART RATE: 105 BPM | TEMPERATURE: 98.1 F | SYSTOLIC BLOOD PRESSURE: 135 MMHG | OXYGEN SATURATION: 98 %

## 2017-06-15 VITALS
RESPIRATION RATE: 16 BRPM | HEART RATE: 130 BPM | SYSTOLIC BLOOD PRESSURE: 115 MMHG | TEMPERATURE: 98 F | OXYGEN SATURATION: 97 % | DIASTOLIC BLOOD PRESSURE: 83 MMHG

## 2017-06-15 VITALS — BODY MASS INDEX: 16.41 KG/M2 | HEIGHT: 63 IN | WEIGHT: 92.59 LBS

## 2017-06-15 DIAGNOSIS — M54.5: ICD-10-CM

## 2017-06-15 DIAGNOSIS — R82.71: ICD-10-CM

## 2017-06-15 DIAGNOSIS — R10.9: Primary | ICD-10-CM

## 2017-06-15 LAB
BACTERIA #/AREA URNS HPF: (no result) /HPF
COLOR UR: (no result)
COMMENT (UR): (no result)
CULTURE IF INDICATED: (no result)
GLUCOSE UR STRIP-MCNC: (no result) MG/DL
HGB UR QL STRIP: (no result)
HYALINE CASTS #/AREA URNS LPF: (no result) /LPF
KETONES UR STRIP-MCNC: (no result) MG/DL
MUCOUS THREADS #/AREA URNS LPF: (no result) /LPF
NITRITE UR QL STRIP: (no result)
SP GR UR STRIP: 1.03 (ref 1–1.03)
SQUAMOUS #/AREA URNS HPF: 78 /HPF (ref 0–5)

## 2017-06-15 PROCEDURE — 99281 EMR DPT VST MAYX REQ PHY/QHP: CPT

## 2017-06-15 PROCEDURE — 81001 URINALYSIS AUTO W/SCOPE: CPT

## 2017-06-15 PROCEDURE — 99283 EMERGENCY DEPT VISIT LOW MDM: CPT

## 2017-06-15 PROCEDURE — 87086 URINE CULTURE/COLONY COUNT: CPT

## 2017-06-15 NOTE — PD
HPI


.


Abdominal pain and back pain with endometriosis history


Chief Complaint:  GI Complaint


Time Seen by Provider:  15:35


Travel History


International Travel<30 days:  No


Contact w/Intl Traveler<30days:  No


Traveled to known affect area:  No





History of Present Illness


HPI


36-year-old female with history of endometriosis reports getting treatment at 

[facility] with Lupron for initiation of menopause here with complaints of nausea, 

abdominal pain and back pain that is very severe 10 out of 10.  Of note patient 

was here in the emergency department earlier this morning around 7 AM and was 

seen by Dr. Wilburn.  She was offered Toradol and Zofran and decided to leave the 

emergency room.  Today I am examining the patient and she is requesting Zofran 

and Dilaudid.  She is in tears telling me this is the only thing that works for 

her.  She is trying to buy time until her next visit at [facility] that she reports 

is next week. She denies any fever or chills.





PFSH


Past Medical History


Hx Anticoagulant Therapy:  No


Arthritis:  No


Asthma:  No


Autoimmune Disease:  No


Blood Disorders:  No


Anxiety:  No


Depression:  No


Heart Rhythm Problems:  No


Cancer:  No


Cardiovascular Problems:  No


High Cholesterol:  No


Chemotherapy:  No


Chest Pain:  No


Congestive Heart Failure:  No


COPD:  No


Cerebrovascular Accident:  No


Diabetes:  No


Diminished Hearing:  No


Endocrine:  No


Gastrointestinal Disorders:  Yes (GASTROPARESIS)


GERD:  Yes


Glaucoma:  No


Genitourinary:  No


Headaches:  No


Hepatitis:  No


Hiatal Hernia:  Yes


Hypertension:  No


Immune Disorder:  No


Implanted Vascular Access Dvce:  No


Kidney Stones:  No


Musculoskeletal:  No


Neurologic:  Yes


Psychiatric:  No


Reproductive:  Yes (ENDIOMETRESIS)


Respiratory:  No


Immunizations Current:  No


Migraines:  No


Myocardial Infarction:  No


Radiation Therapy:  No


Seizures:  No


Sickle Cell Disease:  No


Sleep Apnea:  No


Thyroid Disease:  No


Ulcer:  Yes


:  3


Para:  2


Miscarriage:  0


:  1


Ovarian Cysts:  Yes





Past Surgical History


Abdominal Surgery:  Yes (EXP LAP)


AICD:  No


Appendectomy:  No


Arteriovenous Shunt:  No


Cardiac Surgery:  No


 Section:  Yes


Cholecystectomy:  No


Ear Surgery:  No


Endocrine Surgery:  No


Eye Surgery:  No


Genitourinary Surgery:  Yes


Gynecologic Surgery:  Yes


Insulin Pump:  No


Joint Replacement:  No


Neurologic Surgery:  Yes


Oral Surgery:  No


Pacemaker:  No


Thoracic Surgery:  No


Tonsillectomy:  Yes


Other Surgery:  Yes (colon polyp rempoval)





Social History


Alcohol Use:  No


Tobacco Use:  Yes (1PPD)


Substance Use:  Yes (marijuana)





Allergies-Medications


(Allergen,Severity, Reaction):  


Coded Allergies:  


     Amoxicillin (Verified  Allergy, Severe, VOMITING, 6/15/17)


     Morphine (Verified  Allergy, Severe, Hives , 6/15/17)


     Penicillin (Verified  Allergy, Severe, VOMITING, 6/15/17)


     Sulfa (Verified  Adverse Reaction, Severe, VOMITING, 6/15/17)


Reported Meds & Prescriptions





Reported Meds & Active Scripts


Active


Reported


Lupron Depot Inj Kit (Leuprolide (3 Month) Inj Kit) Unknown Strength Kit 

Unknown Dose IM Q90D








Review of Systems


General / Constitutional:  No: Fever


Eyes:  No: Visual changes


HENT:  No: Headaches


Cardiovascular:  No: Chest Pain or Discomfort


Respiratory:  No: Shortness of Breath


Gastrointestinal:  Positive: Nausea, Vomiting, Abdominal Pain


Genitourinary:  No: Dysuria


Musculoskeletal:  Positive: Pain (back)


Skin:  No Rash


Neurologic:  No: Weakness


Psychiatric:  No: Depression


Endocrine:  No: Polydipsia


Hematologic/Lymphatic:  No: Easy Bruising





Physical Exam


Narrative


GENERAL: AAO x 3, no acute distress, Well-nourished, well-developed patient.  

Laying in bed comfortable


SKIN: Warm and dry. No visible rashes or bruising. 


HEAD: Normocephalic and atraumatic.


EYES: No scleral icterus. No injection or drainage. 


ENT: No nasal drainage noted. Mucous membranes pink. Airway patent. 


NECK: Supple, trachea midline. No JVD.


CARDIOVASCULAR: Regular rate and rhythm without murmurs, gallops, or rubs. 


RESPIRATORY: Breath sounds equal bilaterally. No accessory muscle use. No 

rhonchi or rales. 


GASTROINTESTINAL: Abdomen soft, non-tender, nondistended.  Negative Kiran's 

and McBurney's, no rebound or guarding


EXTREMITIES: No cyanosis or edema. 


BACK: Nontender without obvious deformity. No CVA tenderness.


NEURO: CN II-12 intact,  strength normal b/l, UE and LE 5/5, no focal 

deficits


PSYCH: AAO x 3, normal affect.





Data


Data


Last Documented VS





Vital Signs








  Date Time  Temp Pulse Resp B/P Pulse Ox O2 Delivery O2 Flow Rate FiO2


 


6/15/17 14:19 98.1 105 24 135/91 98 Room Air  








Orders





 Complete Blood Count With Diff (6/15/17 14:28)


Comprehensive Metabolic Panel (6/15/17 14:28)


Urinalysis - C+S If Indicated (6/15/17 14:28)


Urine Culture (6/15/17 14:35)





Labs





 Laboratory Tests








Test 6/15/17





 14:35


 


Urine Color DARK-YELLOW 


 


Urine Turbidity CLOUDY 


 


Urine pH 5.5 


 


Urine Specific Gravity 1.026 


 


Urine Protein 300 mg/dL


 


Urine Glucose (UA) TRACE mg/dL


 


Urine Ketones NEG mg/dL


 


Urine Occult Blood TRACE 


 


Urine Nitrite NEG 


 


Urine Bilirubin NEG 


 


Urine Urobilinogen 2.0 MG/DL


 


Urine Leukocyte Esterase NEG 


 


Urine RBC 4 /hpf


 


Urine WBC 5 /hpf


 


Urine Squamous Epithelial 78 /hpf





Cells 


 


Urine Bacteria MANY /hpf


 


Urine Hyaline Casts INNUM /lpf


 


Urine Mucus MANY /lpf


 


Microscopic Urinalysis Comment CULTURE





 INDICATED











MDM


Medical Decision Making


Medical Screen Exam Complete:  Yes


Emergency Medical Condition:  Yes


Medical Record Reviewed:  Yes


Differential Diagnosis


Acute on chronic pain, endometriosis, nausea and vomiting, gastroenteritis, drug

-seeking behavior


Narrative Course


36-year-old female here with complaints of nausea, vomiting, back pain.


I have done a complete exam and there is no acute findings.  She does not have 

any signs of an acute intra-abdominal process.


Patient is requesting Dilaudid and Zofran.  I've explained to her that 

unfortunately I will not be providing these medications.


I have offered her Toradol and Zofran and she agrees to this.








1544: patient no longer in exam room, it appears she has left the hospital.





Diagnosis





 Primary Impression:  


 Left against medical advice


Condition:  Stable








Luna Neri Lorenzo 15, 2017 15:35

## 2017-06-15 NOTE — PD
HPI


Chief Complaint:  Abdominal Pain


Time Seen by Provider:  07:00


Travel History


International Travel<30 days:  No


Contact w/Intl Traveler<30days:  No


Traveled to known affect area:  No





History of Present Illness


HPI


36-year-old female complains of back pain, nausea vomiting and abdominal pain.  

Patient states the symptoms started 4 days ago.  Patient has been to the 

emergency room multiple times in the past with same problem.  Patient states 

that she has history of endometriosis, gastroparesis and chronic recurrent 

abdominal pain with nausea vomiting and also back pain.  Patient denies any 

recent injury.  Patient denies any fever chills.  Patient denies any headache.  

Patient denies any chest pain or shortness of breath.  Patient denies any 

dysuria or frequency.  Patient denies any vaginal discharge or bleeding.  On a 

scale of 1-10 the pain is a 10.





PFSH


Past Medical History


Hx Anticoagulant Therapy:  No


Arthritis:  No


Asthma:  No


Autoimmune Disease:  No


Blood Disorders:  No


Anxiety:  No


Depression:  No


Heart Rhythm Problems:  No


Cancer:  No


Cardiovascular Problems:  No


High Cholesterol:  No


Chemotherapy:  No


Chest Pain:  No


Congestive Heart Failure:  No


COPD:  No


Cerebrovascular Accident:  No


Diabetes:  No


Diminished Hearing:  No


Endocrine:  No


Gastrointestinal Disorders:  Yes (GASTROPARESIS)


GERD:  Yes


Glaucoma:  No


Genitourinary:  No


Headaches:  No


Hepatitis:  No


Hiatal Hernia:  Yes


Hypertension:  No


Immune Disorder:  No


Implanted Vascular Access Dvce:  No


Kidney Stones:  No


Musculoskeletal:  No


Neurologic:  Yes


Psychiatric:  No


Reproductive:  Yes (ENDIOMETRESIS)


Respiratory:  No


Immunizations Current:  No


Migraines:  No


Myocardial Infarction:  No


Radiation Therapy:  No


Seizures:  No


Sickle Cell Disease:  No


Sleep Apnea:  No


Thyroid Disease:  No


Ulcer:  Yes


Tetanus Vaccination:  > 5 Years


Influenza Vaccination:  No


Pregnant?:  Not Pregnant


LMP:  no menses


:  3


Para:  2


Miscarriage:  0


:  1


Ovarian Cysts:  Yes





Past Surgical History


Abdominal Surgery:  Yes (EXP LAP)


AICD:  No


Appendectomy:  No


Arteriovenous Shunt:  No


Cardiac Surgery:  No


 Section:  Yes


Cholecystectomy:  No


Ear Surgery:  No


Endocrine Surgery:  No


Eye Surgery:  No


Genitourinary Surgery:  Yes


Gynecologic Surgery:  Yes


Insulin Pump:  No


Joint Replacement:  No


Neurologic Surgery:  Yes


Oral Surgery:  No


Pacemaker:  No


Thoracic Surgery:  No


Tonsillectomy:  Yes


Other Surgery:  Yes (colon polyp rempoval)





Social History


Alcohol Use:  No


Tobacco Use:  Yes (1PPD)


Substance Use:  Yes (marijuana)





Allergies-Medications


(Allergen,Severity, Reaction):  


Coded Allergies:  


     Amoxicillin (Verified  Allergy, Severe, VOMITING, 6/15/17)


     Morphine (Verified  Allergy, Severe, Hives , 6/15/17)


     Penicillin (Verified  Allergy, Severe, VOMITING, 6/15/17)


     Sulfa (Verified  Adverse Reaction, Severe, VOMITING, 6/15/17)


Reported Meds & Prescriptions





Reported Meds & Active Scripts


Active


Reported


Lupron Depot Inj Kit (Leuprolide (3 Month) Inj Kit) Unknown Strength Kit 

Unknown Dose IM Q90D








Review of Systems


General / Constitutional:  No: Fever


Eyes:  No: Visual changes


HENT:  No: Headaches


Cardiovascular:  No: Chest Pain or Discomfort


Respiratory:  No: Shortness of Breath


Gastrointestinal:  Positive: Nausea, Vomiting, Abdominal Pain


Genitourinary:  No: Dysuria


Musculoskeletal:  No: Pain


Skin:  No Rash


Neurologic:  No: Weakness


Psychiatric:  No: Depression


Endocrine:  No: Polydipsia


Hematologic/Lymphatic:  No: Easy Bruising





Physical Exam


Narrative


GENERAL: Well-nourished, well-developed patient.


SKIN: Focused skin assessment warm/dry.


HEAD: Normocephalic.


EYES: No scleral icterus. No injection or drainage. 


NECK: Supple, trachea midline. No JVD or lymphadenopathy.


CARDIOVASCULAR: Regular rate and rhythm without murmurs, gallops, or rubs. 


RESPIRATORY: Breath sounds equal bilaterally. No accessory muscle use.


GASTROINTESTINAL: Abdomen soft,  nondistended.  Patient has moderate diffuse 

tenderness over the abdomen.  No rebound tenderness.  No mass.


MUSCULOSKELETAL: No cyanosis, or edema. 


BACK: Patient has moderate tenderness on palpation thoracic and lumbar spine, 

without obvious deformity. No CVA tenderness. 


Neurologic exam normal.





Data


Data


Last Documented VS





Vital Signs








  Date Time  Temp Pulse Resp B/P Pulse Ox O2 Delivery O2 Flow Rate FiO2


 


6/15/17 06:48 98.0 130 16 115/83 97   











MDM


Medical Decision Making


Medical Screen Exam Complete:  Yes


Emergency Medical Condition:  Yes


Differential Diagnosis


Differential diagnosis including acute on chronic abdominal pain and back pain, 

gastritis, PUD, pancreatitis, cholecystitis, colitis, UTI, pyelonephritis, 

lumbar strain, spine fracture, HNP.


Narrative Course


36-year-old female with recurrent abdominal pain, nausea vomiting and back 

pain.  Patient was offered Toradol and Zofran and IV fluid.  Patient refused 

and walked out.





Diagnosis





 Primary Impression:  


 Recurrent abdominal pain


Patient Instructions:  General Instructions





***Additional Instructions:


Patient left AMA.


Disposition:   AGAINST MEDICAL ADVICE


Condition:  Yan Collins MD Lorenzo 15, 2017 07:19
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
## 2017-07-06 ENCOUNTER — HOSPITAL ENCOUNTER (EMERGENCY)
Dept: HOSPITAL 17 - PHED | Age: 37
Discharge: LEFT BEFORE DISCHARGE | End: 2017-07-06
Payer: MEDICAID

## 2017-07-06 ENCOUNTER — HOSPITAL ENCOUNTER (EMERGENCY)
Dept: HOSPITAL 17 - NED | Age: 37
Discharge: LEFT BEFORE DISCHARGE | End: 2017-07-06
Payer: MEDICAID

## 2017-07-06 VITALS — SYSTOLIC BLOOD PRESSURE: 127 MMHG | HEART RATE: 93 BPM | DIASTOLIC BLOOD PRESSURE: 85 MMHG

## 2017-07-06 VITALS — WEIGHT: 100.09 LBS | HEIGHT: 63 IN | BODY MASS INDEX: 17.73 KG/M2

## 2017-07-06 VITALS
TEMPERATURE: 99.1 F | OXYGEN SATURATION: 96 % | RESPIRATION RATE: 18 BRPM | DIASTOLIC BLOOD PRESSURE: 67 MMHG | HEART RATE: 127 BPM | SYSTOLIC BLOOD PRESSURE: 110 MMHG

## 2017-07-06 VITALS
SYSTOLIC BLOOD PRESSURE: 144 MMHG | TEMPERATURE: 98.4 F | OXYGEN SATURATION: 97 % | RESPIRATION RATE: 16 BRPM | DIASTOLIC BLOOD PRESSURE: 106 MMHG | HEART RATE: 100 BPM

## 2017-07-06 VITALS — WEIGHT: 101.41 LBS | HEIGHT: 63 IN | BODY MASS INDEX: 17.97 KG/M2

## 2017-07-06 DIAGNOSIS — R11.10: Primary | ICD-10-CM

## 2017-07-06 DIAGNOSIS — E87.6: Primary | ICD-10-CM

## 2017-07-06 DIAGNOSIS — K31.84: ICD-10-CM

## 2017-07-06 DIAGNOSIS — Z53.29: ICD-10-CM

## 2017-07-06 LAB
ALP SERPL-CCNC: 74 U/L (ref 45–117)
ALT SERPL-CCNC: 19 U/L (ref 10–53)
ANION GAP SERPL CALC-SCNC: 11 MEQ/L (ref 5–15)
AST SERPL-CCNC: 14 U/L (ref 15–37)
BASOPHILS # BLD AUTO: 0.3 TH/MM3 (ref 0–0.2)
BASOPHILS NFR BLD: 1.3 % (ref 0–2)
BILIRUB SERPL-MCNC: 0.6 MG/DL (ref 0.2–1)
BUN SERPL-MCNC: 26 MG/DL (ref 7–18)
CHLORIDE SERPL-SCNC: 102 MEQ/L (ref 98–107)
EOSINOPHIL # BLD: 0 TH/MM3 (ref 0–0.4)
EOSINOPHIL NFR BLD: 0.2 % (ref 0–4)
ERYTHROCYTE [DISTWIDTH] IN BLOOD BY AUTOMATED COUNT: 12.9 % (ref 11.6–17.2)
GFR SERPLBLD BASED ON 1.73 SQ M-ARVRAT: 50 ML/MIN (ref 89–?)
HCO3 BLD-SCNC: 25.3 MEQ/L (ref 21–32)
HCT VFR BLD CALC: 51 % (ref 35–46)
HEMO FLAGS: (no result)
LYMPHOCYTES # BLD AUTO: 1.2 TH/MM3 (ref 1–4.8)
LYMPHOCYTES NFR BLD AUTO: 6 % (ref 9–44)
MAGNESIUM SERPL-MCNC: 2.2 MG/DL (ref 1.5–2.5)
MCH RBC QN AUTO: 31.2 PG (ref 27–34)
MCHC RBC AUTO-ENTMCNC: 33.4 % (ref 32–36)
MCV RBC AUTO: 93.5 FL (ref 80–100)
MONOCYTES NFR BLD: 3.8 % (ref 0–8)
NEUTROPHILS # BLD AUTO: 18.2 TH/MM3 (ref 1.8–7.7)
NEUTROPHILS NFR BLD AUTO: 88.7 % (ref 16–70)
PLATELET # BLD: 370 TH/MM3 (ref 150–450)
POTASSIUM SERPL-SCNC: 2.9 MEQ/L (ref 3.5–5.1)
RBC # BLD AUTO: 5.45 MIL/MM3 (ref 4–5.3)
SODIUM SERPL-SCNC: 138 MEQ/L (ref 136–145)
WBC # BLD AUTO: 20.5 TH/MM3 (ref 4–11)

## 2017-07-06 PROCEDURE — 85025 COMPLETE CBC W/AUTO DIFF WBC: CPT

## 2017-07-06 PROCEDURE — 83735 ASSAY OF MAGNESIUM: CPT

## 2017-07-06 PROCEDURE — 99281 EMR DPT VST MAYX REQ PHY/QHP: CPT

## 2017-07-06 PROCEDURE — 80053 COMPREHEN METABOLIC PANEL: CPT

## 2017-07-06 PROCEDURE — 99284 EMERGENCY DEPT VISIT MOD MDM: CPT

## 2017-07-06 PROCEDURE — 83690 ASSAY OF LIPASE: CPT

## 2017-07-06 PROCEDURE — 96361 HYDRATE IV INFUSION ADD-ON: CPT

## 2017-07-06 PROCEDURE — 96374 THER/PROPH/DIAG INJ IV PUSH: CPT

## 2017-07-06 PROCEDURE — 96375 TX/PRO/DX INJ NEW DRUG ADDON: CPT

## 2017-07-06 NOTE — PD
HPI


Chief Complaint:  Abdominal Pain


Time Seen by Provider:  04:32


Travel History


International Travel<30 days:  No


Contact w/Intl Traveler<30days:  No


Traveled to known affect area:  No





History of Present Illness


HPI


36-year-old female presents to the emergency department by EMS transport from 

home for complaint of abdominal pain.  Patient reports that Dilaudid and Zofran 

with daily medications that work for her pain.  Patient has history of 

endometriosis gastroparesis and chronic abdominal pain and more recently states 

that she was hospitalized 3 weeks ago at [facility] and during that hospitalization 

she was told she has melanoma.  Patient has not followed by an oncologist.  

Patient reportedly lost her insurance and has to wait for her Medicaid to 

become effective in order to be seen by an oncologist reportedly.  Patient does 

not report any fever or chills hematemesis coffee-ground emesis melena 

hematochezia.  Patient does report nausea and vomiting and states that she 

needs Dilaudid and Zofran for pain.  Patient does not report any urinary 

symptoms.  No respiratory illness.  Patient reports her pain as 10 over 10 in 

intensity.  Patient has had multiple visits to the emergency department for 

same complaint and has been identified to have undergone several CT abdomen and 

pelvis exams which show persistent ovarian cysts.  Patient is unable to 

identify exacerbating factors and states Dilaudid and Zofran are the only 

alleviating factors.





PFSH


Past Medical History


*** Narrative Medical


Gastroparesis endometriosis ovarian cyst melanoma hiatal hernia polypectomy C-

section uterine ablation; tobacco use; nursing notes reviewed


Hx Anticoagulant Therapy:  No


Arthritis:  No


Asthma:  No


Autoimmune Disease:  No


Blood Disorders:  No


Anxiety:  No


Depression:  No


Heart Rhythm Problems:  No


Cancer:  Yes (MELANOMA?)


Cardiovascular Problems:  No


High Cholesterol:  No


Chemotherapy:  No


Chest Pain:  No


Congestive Heart Failure:  No


COPD:  No


Cerebrovascular Accident:  No


Diabetes:  No


Diminished Hearing:  No


Endocrine:  No


Gastrointestinal Disorders:  Yes (GASTROPARESIS)


GERD:  Yes


Glaucoma:  No


Genitourinary:  No


Headaches:  No


Hepatitis:  No


Hiatal Hernia:  Yes


Hypertension:  No


Immune Disorder:  No


Implanted Vascular Access Dvce:  No


Kidney Stones:  No


Musculoskeletal:  No


Neurologic:  Yes


Psychiatric:  No


Reproductive:  Yes (ENDOMETRIOSIS)


Respiratory:  No


Immunizations Current:  No


Migraines:  No


Myocardial Infarction:  No


Radiation Therapy:  No


Seizures:  No


Sickle Cell Disease:  No


Sleep Apnea:  No


Thyroid Disease:  No


Ulcer:  Yes


Pregnant?:  Not Pregnant


LMP:  2016


:  3


Para:  2


Miscarriage:  0


:  1


Ovarian Cysts:  Yes





Past Surgical History


Abdominal Surgery:  Yes (EXP LAP)


AICD:  No


Appendectomy:  No


Arteriovenous Shunt:  No


Cardiac Surgery:  No


 Section:  Yes


Cholecystectomy:  No


Ear Surgery:  No


Endocrine Surgery:  No


Eye Surgery:  No


Genitourinary Surgery:  Yes


Gynecologic Surgery:  Yes (UTERINE ABLATION: 2016)


Insulin Pump:  No


Joint Replacement:  No


Neurologic Surgery:  Yes


Oral Surgery:  No


Pacemaker:  No


Thoracic Surgery:  No


Tonsillectomy:  Yes


Other Surgery:  Yes (colon polyp rempoval)





Social History


Alcohol Use:  No


Tobacco Use:  Yes (1PPD)


Substance Use:  Yes (marijuana)





Allergies-Medications


(Allergen,Severity, Reaction):  


Coded Allergies:  


     Amoxicillin (Verified  Allergy, Severe, VOMITING, 17)


     Morphine (Verified  Allergy, Severe, Hives , 17)


     Penicillin (Verified  Allergy, Severe, VOMITING, 17)


     Sulfa (Verified  Adverse Reaction, Severe, VOMITING, 17)


Reported Meds & Prescriptions





Reported Meds & Active Scripts


Active


Reported


Zofran (Ondansetron HCl) 4 Mg Tab 4 Mg PO Q6HR PRN


Lupron Depot Inj Kit (Leuprolide (3 Month) Inj Kit) Unknown Strength Kit 

Unknown Dose IM Q90D








Review of Systems


Except as stated in HPI:  all other systems reviewed are Neg


General / Constitutional:  No: Fever


HENT:  No: Congestion


Cardiovascular:  No: Chest Pain or Discomfort


Respiratory:  No: Shortness of Breath


Gastrointestinal:  Positive: Vomiting, Abdominal Pain


Genitourinary:  No: Dysuria, Flank Pain


Musculoskeletal:  No: Myalgias, Arthralgias


Skin:  No Rash


Neurologic:  No: Weakness


Endocrine:  No: Heat Intolerance


Hematologic/Lymphatic:  No: Easy Bruising





Physical Exam


Narrative


GENERAL: Well-developed thin female in no respiratory distress tearful 

requesting pain medication


SKIN: Warm and dry.


HEAD: Normocephalic.


EYES: No scleral icterus. No injection or drainage. 


NECK: Supple, trachea midline. No JVD or lymphadenopathy.


CARDIOVASCULAR: Regular rate and rhythm without murmurs, gallops, or rubs. 


RESPIRATORY: Breath sounds equal bilaterally. No accessory muscle use.


GASTROINTESTINAL: Abdomen soft, non-tender, nondistended. 


MUSCULOSKELETAL: No cyanosis, or edema. 


BACK: Nontender without obvious deformity. No CVA tenderness.








Data


Data


Last Documented VS





Vital Signs








  Date Time  Temp Pulse Resp B/P Pulse Ox O2 Delivery O2 Flow Rate FiO2


 


17 04:55  93  127/85  Room Air  


 


17 04:05 98.4  16  97   








Orders





 Complete Blood Count With Diff (17 04:30)


Comprehensive Metabolic Panel (17 04:30)


Lipase (17 04:30)


Urinalysis - C+S If Indicated (17 04:30)


Iv Access Insert/Monitor (17 04:30)


Ecg Monitoring (17 04:30)


Oximetry (17 04:30)


Sodium Chloride 0.9% Flush (Ns Flush) (17 04:30)


Sodium Chlorid 0.9% 500 Ml Inj (Ns 500 M (17 04:30)


Drug Screen, Random Urine (17 04:30)


Magnesium (Mg) (17 04:30)


Ketorolac Inj (Toradol Inj) (17 05:00)


Ondansetron Inj (Zofran Inj) (17 05:00)


Metoclopramide Inj (Reglan Inj) (17 05:30)


Potassium Chloride (Kcl) (17 05:30)


Abdomen, Flat & Upright (17 )


Potassium Chlor 10 Meq Premix (Kcl 10 Me (17 05:30)


Pantoprazole Inj (Protonix Inj) (17 05:30)





Labs





 Laboratory Tests








Test 17





 04:25


 


White Blood Count 20.5 TH/MM3


 


Red Blood Count 5.45 MIL/MM3


 


Hemoglobin 17.0 GM/DL


 


Hematocrit 51.0 %


 


Mean Corpuscular Volume 93.5 FL


 


Mean Corpuscular Hemoglobin 31.2 PG


 


Mean Corpuscular Hemoglobin 33.4 %





Concent 


 


Red Cell Distribution Width 12.9 %


 


Platelet Count 370 TH/MM3


 


Mean Platelet Volume 8.2 FL


 


Neutrophils (%) (Auto) 88.7 %


 


Lymphocytes (%) (Auto) 6.0 %


 


Monocytes (%) (Auto) 3.8 %


 


Eosinophils (%) (Auto) 0.2 %


 


Basophils (%) (Auto) 1.3 %


 


Neutrophils # (Auto) 18.2 TH/MM3


 


Lymphocytes # (Auto) 1.2 TH/MM3


 


Monocytes # (Auto) 0.8 TH/MM3


 


Eosinophils # (Auto) 0.0 TH/MM3


 


Basophils # (Auto) 0.3 TH/MM3


 


CBC Comment DIFF FINAL 


 


Differential Comment  


 


Sodium Level 138 MEQ/L


 


Potassium Level 2.9 MEQ/L


 


Chloride Level 102 MEQ/L


 


Carbon Dioxide Level 25.3 MEQ/L


 


Anion Gap 11 MEQ/L


 


Blood Urea Nitrogen 26 MG/DL


 


Creatinine 1.20 MG/DL


 


Estimat Glomerular Filtration 50 ML/MIN





Rate 


 


Random Glucose 150 MG/DL


 


Calcium Level 10.0 MG/DL


 


Magnesium Level 2.2 MG/DL


 


Total Bilirubin 0.6 MG/DL


 


Aspartate Amino Transf 14 U/L





(AST/SGOT) 


 


Alanine Aminotransferase 19 U/L





(ALT/SGPT) 


 


Alkaline Phosphatase 74 U/L


 


Total Protein 9.4 GM/DL


 


Albumin 5.0 GM/DL


 


Lipase 93 U/L











MDM


Medical Decision Making


Medical Screen Exam Complete:  Yes


Emergency Medical Condition:  Yes


Medical Record Reviewed:  Yes


Differential Diagnosis


Chronic pain syndrome, gastroparesis, possible cyclic vomiting syndrome, 

abdominal pain, bowel obstruction, UTI, endometriosis pain, ruptured ovarian 

cyst, bowel obstruction


Narrative Course


IV access obtained by EMS and in the emergency department specimens collected 

and sent for resulting; patient receiving 500 cc bolus by EMS and will be given 

an additional 500 cc bolus of IV fluids patient and offered Zofran and Toradol


Patient again specifically asking for Dilaudid and informed patient that at 

this time would not be administering Dilaudid


Patient identified to have low potassium; patient sitting quietly upright on 

stretcher in no distress when I entered her room and informed that we'll 

administer Reglan 10 mg IV and oral potassium for potassium replacement as well 

as IV potassium upon hearing this the patient again throws herself back onto 

the stretcher and asks again for Dilaudid stating a one time dose of Dilaudid 

is the only thing that will help her.  Patient informed that she needs 

potassium replacement at this time and plan of medication administration.


At 5:39 AM informed by x-ray tech that patient has eloped from the exam room 

while I was attending to another patient. This was confirmed by patient's RN.





Diagnosis





 Primary Impression:  


 Left against medical advice


 Additional Impression:  


 Hypokalemia





***Additional Instructions:


AMA


Disposition:  07 AGAINST MEDICAL ADVICE


Condition:  Stable








Salter,Hannah H. MD 2017 04:38

## 2017-07-17 ENCOUNTER — HOSPITAL ENCOUNTER (EMERGENCY)
Dept: HOSPITAL 17 - NEPD | Age: 37
Discharge: HOME | End: 2017-07-17
Payer: MEDICAID

## 2017-07-17 VITALS
DIASTOLIC BLOOD PRESSURE: 66 MMHG | RESPIRATION RATE: 14 BRPM | SYSTOLIC BLOOD PRESSURE: 129 MMHG | OXYGEN SATURATION: 100 % | TEMPERATURE: 97.9 F | HEART RATE: 84 BPM

## 2017-07-17 VITALS — SYSTOLIC BLOOD PRESSURE: 130 MMHG | DIASTOLIC BLOOD PRESSURE: 77 MMHG | TEMPERATURE: 97.8 F

## 2017-07-17 VITALS — WEIGHT: 105.82 LBS | BODY MASS INDEX: 18.75 KG/M2 | HEIGHT: 63 IN

## 2017-07-17 DIAGNOSIS — Z86.69: ICD-10-CM

## 2017-07-17 DIAGNOSIS — Z87.42: ICD-10-CM

## 2017-07-17 DIAGNOSIS — I89.1: Primary | ICD-10-CM

## 2017-07-17 DIAGNOSIS — Z87.19: ICD-10-CM

## 2017-07-17 DIAGNOSIS — F17.200: ICD-10-CM

## 2017-07-17 LAB
ANION GAP SERPL CALC-SCNC: 7 MEQ/L (ref 5–15)
BASOPHILS # BLD AUTO: 0.1 TH/MM3 (ref 0–0.2)
BASOPHILS NFR BLD: 0.6 % (ref 0–2)
BUN SERPL-MCNC: 12 MG/DL (ref 7–18)
CHLORIDE SERPL-SCNC: 104 MEQ/L (ref 98–107)
EOSINOPHIL # BLD: 0.2 TH/MM3 (ref 0–0.4)
EOSINOPHIL NFR BLD: 1.3 % (ref 0–4)
ERYTHROCYTE [DISTWIDTH] IN BLOOD BY AUTOMATED COUNT: 13.4 % (ref 11.6–17.2)
GFR SERPLBLD BASED ON 1.73 SQ M-ARVRAT: 97 ML/MIN (ref 89–?)
HCO3 BLD-SCNC: 25.8 MEQ/L (ref 21–32)
HCT VFR BLD CALC: 42.5 % (ref 35–46)
HEMO FLAGS: (no result)
LYMPHOCYTES # BLD AUTO: 2.8 TH/MM3 (ref 1–4.8)
LYMPHOCYTES NFR BLD AUTO: 23.9 % (ref 9–44)
MCH RBC QN AUTO: 31.6 PG (ref 27–34)
MCHC RBC AUTO-ENTMCNC: 33.5 % (ref 32–36)
MCV RBC AUTO: 94.3 FL (ref 80–100)
MONOCYTES NFR BLD: 5 % (ref 0–8)
NEUTROPHILS # BLD AUTO: 8.2 TH/MM3 (ref 1.8–7.7)
NEUTROPHILS NFR BLD AUTO: 69.2 % (ref 16–70)
PLATELET # BLD: 284 TH/MM3 (ref 150–450)
POTASSIUM SERPL-SCNC: 3.5 MEQ/L (ref 3.5–5.1)
RBC # BLD AUTO: 4.51 MIL/MM3 (ref 4–5.3)
SODIUM SERPL-SCNC: 137 MEQ/L (ref 136–145)
WBC # BLD AUTO: 11.8 TH/MM3 (ref 4–11)

## 2017-07-17 PROCEDURE — 83605 ASSAY OF LACTIC ACID: CPT

## 2017-07-17 PROCEDURE — 85025 COMPLETE CBC W/AUTO DIFF WBC: CPT

## 2017-07-17 PROCEDURE — 80048 BASIC METABOLIC PNL TOTAL CA: CPT

## 2017-07-17 PROCEDURE — 99283 EMERGENCY DEPT VISIT LOW MDM: CPT

## 2017-07-17 PROCEDURE — 87040 BLOOD CULTURE FOR BACTERIA: CPT

## 2017-07-17 NOTE — PD
HPI


Chief Complaint:  Pain: Acute or Chronic


Time Seen by Provider:  16:23


Travel History


International Travel<30 days:  No


Contact w/Intl Traveler<30days:  No


Traveled to known affect area:  No





History of Present Illness


HPI


36-year-old female presents emergency department for evaluation of a red streak 

going up her left arm.  Patient reports yesterday she developed mild pain 

within the forearm.  Upon waking today she noticed some redness starting near 

the wrist extending up to the axilla.  She denies fever or chills.  She denies 

history of drug abuse.  She reports the pain as mild.  No aggravating or 

alleviating factors.  Past medical history significant only for endometriosis.





PFSH


Past Medical History


*** Narrative Medical


Significant for Endometriosis


Hx Anticoagulant Therapy:  No


Arthritis:  No


Asthma:  No


Autoimmune Disease:  No


Blood Disorders:  No


Anxiety:  No


Depression:  No


Heart Rhythm Problems:  No


Cancer:  Yes (MELANOMA?)


Cardiovascular Problems:  No


High Cholesterol:  No


Chemotherapy:  No


Chest Pain:  No


Congestive Heart Failure:  No


COPD:  No


Cerebrovascular Accident:  No


Diabetes:  No


Diminished Hearing:  No


Endocrine:  No


Gastrointestinal Disorders:  Yes (GASTROPARESIS)


GERD:  Yes


Glaucoma:  No


Genitourinary:  No


Headaches:  No


Hepatitis:  No


Hiatal Hernia:  Yes


Hypertension:  No


Immune Disorder:  No


Implanted Vascular Access Dvce:  No


Kidney Stones:  No


Musculoskeletal:  No


Neurologic:  Yes


Psychiatric:  No


Reproductive:  Yes (ENDOMETRIOSIS)


Respiratory:  No


Immunizations Current:  No


Migraines:  No


Myocardial Infarction:  No


Radiation Therapy:  No


Seizures:  No


Sickle Cell Disease:  No


Sleep Apnea:  No


Thyroid Disease:  No


Ulcer:  Yes


Tetanus Vaccination:  > 5 Years


Influenza Vaccination:  Yes


Pregnant?:  Not Pregnant


:  3


Para:  2


Miscarriage:  0


:  1


Ovarian Cysts:  Yes





Past Surgical History


Abdominal Surgery:  Yes (EXP LAP)


AICD:  No


Appendectomy:  No


Arteriovenous Shunt:  No


Cardiac Surgery:  No


 Section:  Yes


Cholecystectomy:  No


Ear Surgery:  No


Endocrine Surgery:  No


Eye Surgery:  No


Genitourinary Surgery:  Yes


Gynecologic Surgery:  Yes (UTERINE ABLATION: 2016)


Insulin Pump:  No


Joint Replacement:  No


Neurologic Surgery:  Yes


Oral Surgery:  No


Pacemaker:  No


Thoracic Surgery:  No


Tonsillectomy:  Yes


Other Surgery:  Yes (colon polyp rempoval)





Social History


Alcohol Use:  No (pt denies)


Tobacco Use:  Yes (1PPD)


Substance Use:  Yes (marijuana)





Allergies-Medications


(Allergen,Severity, Reaction):  


Coded Allergies:  


     Amoxicillin (Verified  Allergy, Severe, VOMITING, 17)


     Morphine (Verified  Allergy, Severe, Hives , 17)


     Penicillin (Verified  Allergy, Severe, VOMITING, 17)


     Sulfa (Verified  Adverse Reaction, Severe, VOMITING, 17)


Reported Meds & Prescriptions





Reported Meds & Active Scripts


Active


Clindamycin (Clindamycin HCl) 300 Mg Cap 300 Mg PO Q6H


Reported


Lupron Depot Inj Kit (Leuprolide (3 Month) Inj Kit) Unknown Strength Kit 

Unknown Dose IM Q90D








Review of Systems


Except as stated in HPI:  all other systems reviewed are Neg


General / Constitutional:  No: Fever


Eyes:  No: Visual changes


HENT:  No: Headaches


Cardiovascular:  No: Chest Pain or Discomfort


Respiratory:  No: Shortness of Breath


Gastrointestinal:  No: Abdominal Pain


Genitourinary:  No: Dysuria


Musculoskeletal:  No: Pain





Physical Exam


Narrative


GENERAL: alert well appearing female.


SKIN: Warm and dry. lymphangitis of the LUE-mild erythema extending from the 

distal forearm to the axilla region. no identifiable abscess or wound. 


HEAD: Normocephalic.


EYES: No scleral icterus. No injection or drainage. 


NECK: Supple, trachea midline. No JVD or lymphadenopathy.


CARDIOVASCULAR: Regular rate and rhythm without murmurs, gallops, or rubs. 


RESPIRATORY: Breath sounds equal bilaterally. No accessory muscle use.


GASTROINTESTINAL: Abdomen soft, non-tender, nondistended. 


MUSCULOSKELETAL: No cyanosis, or edema. lymphangitis of the LUE-mild erythema 

extending from the distal forearm to the axilla region. no identifiable abscess 

or wound.  No edema or tenderness.  Mild left axillary lymphadenopathy.


BACK: Nontender without obvious deformity. No CVA tenderness.








Data


Data


Last Documented VS





Vital Signs








  Date Time  Temp Pulse Resp B/P Pulse Ox O2 Delivery O2 Flow Rate FiO2


 


17 17:48 97.8 76 16 130/77 99   








Orders





 Basic Metabolic Panel (Bmp) (17 16:21)


Complete Blood Count With Diff (17 16:21)


Blood Culture (17 16:21)


Iv Access Insert/Monitor (17 16:21)


Lactic Acid (17 16:21)





Labs





 Laboratory Tests








Test 17





 16:35


 


White Blood Count 11.8 TH/MM3


 


Red Blood Count 4.51 MIL/MM3


 


Hemoglobin 14.2 GM/DL


 


Hematocrit 42.5 %


 


Mean Corpuscular Volume 94.3 FL


 


Mean Corpuscular Hemoglobin 31.6 PG


 


Mean Corpuscular Hemoglobin 33.5 %





Concent 


 


Red Cell Distribution Width 13.4 %


 


Platelet Count 284 TH/MM3


 


Mean Platelet Volume 7.5 FL


 


Neutrophils (%) (Auto) 69.2 %


 


Lymphocytes (%) (Auto) 23.9 %


 


Monocytes (%) (Auto) 5.0 %


 


Eosinophils (%) (Auto) 1.3 %


 


Basophils (%) (Auto) 0.6 %


 


Neutrophils # (Auto) 8.2 TH/MM3


 


Lymphocytes # (Auto) 2.8 TH/MM3


 


Monocytes # (Auto) 0.6 TH/MM3


 


Eosinophils # (Auto) 0.2 TH/MM3


 


Basophils # (Auto) 0.1 TH/MM3


 


CBC Comment DIFF FINAL 


 


Differential Comment  


 


Sodium Level 137 MEQ/L


 


Potassium Level 3.5 MEQ/L


 


Chloride Level 104 MEQ/L


 


Carbon Dioxide Level 25.8 MEQ/L


 


Anion Gap 7 MEQ/L


 


Blood Urea Nitrogen 12 MG/DL


 


Creatinine 0.68 MG/DL


 


Estimat Glomerular Filtration 97 ML/MIN





Rate 


 


Random Glucose 75 MG/DL


 


Lactic Acid Level 0.9 mmol/L


 


Calcium Level 8.9 MG/DL











[facility]


Medical Decision Making


Medical Screen Exam Complete:  Yes


Emergency Medical Condition:  Yes


Differential Diagnosis


Lymphangitis, cellulitis, abscess


Narrative Course


36-year-old female presents to the emergency department for evaluation of a red 

streak in her left forearm beginning today.  Patient denies fever or chills.  

She denies history of IV drug abuse. physical exam is lymphangitis starting at 

the area of a small abrasion on the distal forearm.  There is no evidence of 

induration or abscess.  Patient is afebrile.  Nontoxic appearing.





CBC: WBC 11.8.


BMP unremarkable


Lactic acid 0.9





Patient will be discharged home with a perception of clindamycin.  Return 

precautions discussed.  Patient is to have close follow-up with her primary 

care provider in one to 2 days.  Patient verbalizes understanding and agrees to 

plan.





Diagnosis





 Primary Impression:  


 Lymphangitis


Referrals:  


Primary Care Physician





***Additional Instructions:


Take the antibiotics as prescribed.


I would like close follow-up in 1-2 days with your primary doctor.


Return to the emergency department if he develop new or worsening symptoms such 

as fever or chills.


Scripts


Clindamycin 300 Mg Lkg622 Mg PO Q6H  #40 CAP


   Prov:Sally Durbin         17


Disposition:  01 DISCHARGE HOME


Condition:  Stable








Sally Durbin 2017 16:31

## 2017-08-11 ENCOUNTER — HOSPITAL ENCOUNTER (EMERGENCY)
Dept: HOSPITAL 17 - NEPD | Age: 37
Discharge: LEFT BEFORE DISCHARGE | End: 2017-08-11
Payer: MEDICAID

## 2017-08-11 VITALS
OXYGEN SATURATION: 94 % | TEMPERATURE: 100.3 F | DIASTOLIC BLOOD PRESSURE: 84 MMHG | RESPIRATION RATE: 20 BRPM | SYSTOLIC BLOOD PRESSURE: 144 MMHG | HEART RATE: 91 BPM

## 2017-08-11 VITALS — WEIGHT: 99.21 LBS | HEIGHT: 63 IN | BODY MASS INDEX: 17.58 KG/M2

## 2017-08-11 DIAGNOSIS — Z88.5: ICD-10-CM

## 2017-08-11 DIAGNOSIS — F17.200: ICD-10-CM

## 2017-08-11 DIAGNOSIS — Z76.5: ICD-10-CM

## 2017-08-11 DIAGNOSIS — Z88.0: ICD-10-CM

## 2017-08-11 DIAGNOSIS — K31.84: ICD-10-CM

## 2017-08-11 DIAGNOSIS — Z88.2: ICD-10-CM

## 2017-08-11 DIAGNOSIS — R10.9: Primary | ICD-10-CM

## 2017-08-11 DIAGNOSIS — Z79.899: ICD-10-CM

## 2017-08-11 PROCEDURE — 99283 EMERGENCY DEPT VISIT LOW MDM: CPT

## 2017-08-11 NOTE — PD
HPI


Chief Complaint:  Abdominal Pain


Time Seen by Provider:  14:38


Travel History


International Travel<30 days:  No


Contact w/Intl Traveler<30days:  No


Traveled to known affect area:  No





History of Present Illness


HPI


36 year-old woman history of chronic recurrent abdominal pain, attributed to 

gastroparesis endometriosis ovarian cysts and hiatal hernia, as well as 

possible marijuana hyperemesis syndrome according to records, presents to the 

emergency department writhing in the bed crying complaining of abdominal pain 

that she attributes to endometriosis.  She states she follows at [facility].  So she

's had multiple laser procedures for endometriosis in the past.  Symptoms were 

worsening today and are now excruciating.





History


Past Medical History


*** Narrative Medical


Chronic recurrent abdominal pain with history of gastroparesis, endometriosis, 

ovarian cyst, hiatal hernia


:  3


Para:  2





Social History


Alcohol Use:  No (pt denies)


Tobacco Use:  Yes (1PPD)





Allergies-Medications


(Allergen,Severity, Reaction):  


Coded Allergies:  


     Amoxicillin (Verified  Allergy, Severe, VOMITING, 17)


     Morphine (Verified  Allergy, Severe, Hives , 17)


     Penicillin (Verified  Allergy, Severe, VOMITING, 17)


     Sulfa (Verified  Adverse Reaction, Severe, VOMITING, 17)


Reported Meds & Prescriptions





Reported Meds & Active Scripts


Active


Clindamycin (Clindamycin HCl) 300 Mg Cap 300 Mg PO Q6H


Reported


Lupron Depot Inj Kit (Leuprolide (3 Month) Inj Kit) Unknown Strength Kit 

Unknown Dose IM Q90D








Review of Systems


Except as stated in HPI:  all other systems reviewed are Neg





Physical Exam


Narrative


GENERAL: 36 year-old woman, uncomfortable, the fetal position on the bed and 

tearful.


SKIN: Focused skin assessment warm/dry.  She has unusual purple discoloration 

to her back in a reticular pattern


NECK: Trachea midline. No JVD. 


CARDIOVASCULAR: Warm and well perfused.


RESPIRATORY: Normal rate and effort.


GASTROINTESTINAL: Scaphoid and flat.


MUSCULOSKELETAL: No obvious deformities.





Data


Data


Last Documented VS





Vital Signs








  Date Time  Temp Pulse Resp B/P Pulse Ox O2 Delivery O2 Flow Rate FiO2


 


17 14:32 100.3 91 20 144/84 94   








Orders





 Ketorolac Inj (Toradol Inj) (17 15:00)


Sodium Chlor 0.9% 1000 Ml Inj (Ns 1000 M (17 15:00)


Ondansetron Inj (Zofran Inj) (17 15:00)


Prochlorperazine Inj (Compazine Inj) (17 15:00)


Diphenhydramine Inj (Benadryl Inj) (17 15:00)








MDM


Medical Decision Making


Medical Screen Exam Complete:  Yes


Emergency Medical Condition:  Yes


Differential Diagnosis


Chronic recurrent abdominal pain, endometriosis, marijuana hyperemesis syndrome

, sick with vomiting, opiate withdrawal, other


Narrative Course


36 year-old woman of multiple admissions for recurrent abdominal pain she 

related to endometriosis.  She does have opiates filled from [facility].  She 

states she goes there for her endometriosis.  She's had recurrent trouble with 

the abdominal pain.  She's had multiple CT scans in the past couple months 

along the been negative.  She appears very dramatic, writhing in bed, screaming 

out, complaining of abdominal pain.  She has marks on her back she attributes 

to hot baths reasonable possibility of marijuana hyperemesis syndrome.  She's 

been positive for marijuana every time she's been checked in the past several 

months.  She states she has talked about this before, is quick, and 

intermittent change or pain.  She states only that it takes with her pain is IV 

Dilaudid and Zofran.  I told her that while would be happy or to get her pain 

under control, I will not prescribe IV opiates for chronic recurrent abdominal 

pain.  I explained that this is because of the long-term adverse effects 

associated with IV opiates for recurrent abdominal pain.  After I left the room

, she stopped crying and wailing, collected her things and walked out.





Diagnosis





 Primary Impression:  


 Drug-seeking behavior


 Additional Impression:  


 Recurrent abdominal pain


Disposition:  07 AGAINST MEDICAL ADVICE








Pérez Blackburn MD Aug 11, 2017 15:06

## 2017-08-31 ENCOUNTER — HOSPITAL ENCOUNTER (EMERGENCY)
Dept: HOSPITAL 17 - PHED | Age: 37
Discharge: LEFT BEFORE DISCHARGE | End: 2017-08-31
Payer: MEDICAID

## 2017-08-31 VITALS — BODY MASS INDEX: 18.36 KG/M2 | WEIGHT: 103.62 LBS | HEIGHT: 63 IN

## 2017-08-31 VITALS
RESPIRATION RATE: 18 BRPM | OXYGEN SATURATION: 95 % | HEART RATE: 105 BPM | DIASTOLIC BLOOD PRESSURE: 87 MMHG | TEMPERATURE: 99.5 F | SYSTOLIC BLOOD PRESSURE: 162 MMHG

## 2017-08-31 DIAGNOSIS — R10.9: Primary | ICD-10-CM

## 2017-08-31 DIAGNOSIS — K31.84: ICD-10-CM

## 2017-08-31 DIAGNOSIS — G89.29: ICD-10-CM

## 2017-08-31 PROCEDURE — 99281 EMR DPT VST MAYX REQ PHY/QHP: CPT

## 2017-08-31 NOTE — PD
HPI


Chief Complaint:  GI Complaint


Time Seen by Provider:  11:07


Travel History


International Travel<30 days:  No


Contact w/Intl Traveler<30days:  No


Traveled to known affect area:  No





History of Present Illness


HPI


37-year-old female complains of back pain and abdominal pain nausea vomiting.  

Patient has history of chronic recurrent abdominal pain and back pain, 

gastroparesis, endometriosis, ovarian cyst and hiatal hernia.  Patient has been 

to the emergency room multiple times in the past for the same problem.  Patient 

states that she has been to [facility] and has been taking Dilaudid for 

pain.  Patient states that she has persistent pain despite the medication.  

Patient denies any headache.  Patient denies any chest pain or shortness of 

breath.  Patient denies any fever chills.  Patient denies any focal weakness or 

numbness of the extremity.  Patient has been to the emergency room multiple 

times in the past and workups has been negative.





PFSH


Past Medical History


Hx Anticoagulant Therapy:  No


Arthritis:  No


Asthma:  No


Autoimmune Disease:  No


Blood Disorders:  No


Anxiety:  No


Depression:  No


Heart Rhythm Problems:  No


Cancer:  Yes (MELANOMA?)


Cardiovascular Problems:  No


High Cholesterol:  No


Chemotherapy:  No


Chest Pain:  No


Congestive Heart Failure:  No


COPD:  No


Cerebrovascular Accident:  No


Diabetes:  No


Diminished Hearing:  No


Endocrine:  No


Gastrointestinal Disorders:  Yes (GASTROPARESIS)


GERD:  Yes


Glaucoma:  No


Genitourinary:  No


Headaches:  No


Hepatitis:  No


Hiatal Hernia:  Yes


Hypertension:  No


Immune Disorder:  No


Implanted Vascular Access Dvce:  No


Kidney Stones:  No


Musculoskeletal:  No


Neurologic:  Yes


Psychiatric:  No


Reproductive:  Yes (ENDOMETRIOSIS)


Respiratory:  No


Immunizations Current:  No


Migraines:  No


Myocardial Infarction:  No


Radiation Therapy:  No


Renal Failure:  Yes (ARF)


Seizures:  No


Sickle Cell Disease:  No


Sleep Apnea:  No


Thyroid Disease:  No


Ulcer:  Yes


Influenza Vaccination:  No


Pregnant?:  Not Pregnant


LMP:  pt had ablasion, no period


:  3


Para:  2


Miscarriage:  0


:  1


Ovarian Cysts:  Yes





Past Surgical History


Abdominal Surgery:  Yes (EXP LAP)


AICD:  No


Appendectomy:  No


Arteriovenous Shunt:  No


Cardiac Surgery:  No


 Section:  Yes


Cholecystectomy:  No


Ear Surgery:  No


Endocrine Surgery:  No


Eye Surgery:  No


Genitourinary Surgery:  Yes


Gynecologic Surgery:  Yes (UTERINE ABLATION: 2016)


Insulin Pump:  No


Joint Replacement:  No


Neurologic Surgery:  Yes


Oral Surgery:  No


Pacemaker:  No


Thoracic Surgery:  No


Tonsillectomy:  Yes


Other Surgery:  Yes (colon polyp rempoval)





Social History


Alcohol Use:  No (pt denies)


Tobacco Use:  No (QUIT)


Substance Use:  Yes (marijuana)





Allergies-Medications


(Allergen,Severity, Reaction):  


Coded Allergies:  


     amoxicillin (Unverified  Allergy, Severe, VOMITING, 17)


     morphine (Unverified  Allergy, Severe, Hives , 17)


     penicillin G (Unverified  Allergy, Severe, VOMITING, 17)


     Sulfa (Sulfonamide Antibiotics) (Unverified  Adverse Reaction, Severe, 

VOMITING, 17)


Reported Meds & Prescriptions





Reported Meds & Active Scripts


Active








Review of Systems


General / Constitutional:  No: Fever


Eyes:  No: Visual changes


HENT:  No: Headaches


Cardiovascular:  No: Chest Pain or Discomfort


Respiratory:  No: Shortness of Breath


Gastrointestinal:  Positive: Abdominal Pain


Genitourinary:  No: Dysuria


Musculoskeletal:  Positive: Pain


Skin:  No Rash


Neurologic:  No: Weakness


Psychiatric:  No: Depression


Endocrine:  No: Polydipsia


Hematologic/Lymphatic:  No: Easy Bruising





Physical Exam


Narrative


GENERAL: Well-nourished, well-developed patient.


SKIN: Focused skin assessment warm/dry.


HEAD: Normocephalic.


EYES: No scleral icterus. No injection or drainage. 


NECK: Supple, trachea midline. No JVD or lymphadenopathy.


CARDIOVASCULAR: Regular rate and rhythm without murmurs, gallops, or rubs. 


RESPIRATORY: Breath sounds equal bilaterally. No accessory muscle use.


GASTROINTESTINAL: Abdomen soft, non-tender, nondistended. 


MUSCULOSKELETAL: No cyanosis, or edema. 


BACK: Nontender without obvious deformity. No CVA tenderness. 


Neurologic exam normal.





Data


Data


Last Documented VS





Vital Signs








  Date Time  Temp Pulse Resp B/P (MAP) Pulse Ox O2 Delivery O2 Flow Rate FiO2


 


17 10:27 99.5 105 18 162/87 (112) 95 Room Air  








Orders





 Orders


Ketorolac Inj (Toradol Inj) (17 11:15)


Hydroxyzine Hcl Inj (Vistaril Inj) (17 11:15)








MDM


Medical Decision Making


Medical Screen Exam Complete:  Yes


Emergency Medical Condition:  Yes


Differential Diagnosis


Differential diagnosis including opiate withdrawal symptoms, drug-seeking 

behavior, recurrent chronic pain.


Narrative Course


37-year-old female with recurrent abdominal pain and extremity pain and back 

pain.  Multiple workup in the emergency room recently has been negative.  

Patient has been seen at [facility] and states that she is on Dilaudid for 

pain.  Physical exam today shows no acute process.  Vital signs stable.  

Patient was offered Toradol IM injection and Vistaril IM injection.  Patient 

refused and walked out.  Patient was observed walking out without any distress 

and no problem with ambulation and no pain.





Diagnosis





 Primary Impression:  


 Chronic abdominal pain


Patient Instructions:  General Instructions





***Additional Instructions:  


Patient left after being seen.


***Med/Other Pt SpecificInfo:  No Change to Meds


Disposition:  07 AGAINST MEDICAL ADVICE


Condition:  Stable











Yan Wilburn MD Aug 31, 2017 11:21

## 2017-12-10 ENCOUNTER — HOSPITAL ENCOUNTER (EMERGENCY)
Dept: HOSPITAL 17 - NEPE | Age: 37
Discharge: LEFT BEFORE DISCHARGE | End: 2017-12-10
Payer: MEDICAID

## 2017-12-10 VITALS
SYSTOLIC BLOOD PRESSURE: 147 MMHG | DIASTOLIC BLOOD PRESSURE: 99 MMHG | HEART RATE: 98 BPM | TEMPERATURE: 98.2 F | RESPIRATION RATE: 20 BRPM | OXYGEN SATURATION: 98 %

## 2017-12-10 VITALS — WEIGHT: 99.21 LBS | BODY MASS INDEX: 17.58 KG/M2 | HEIGHT: 63 IN

## 2017-12-10 DIAGNOSIS — N17.9: ICD-10-CM

## 2017-12-10 DIAGNOSIS — Z88.0: ICD-10-CM

## 2017-12-10 DIAGNOSIS — K31.84: ICD-10-CM

## 2017-12-10 DIAGNOSIS — Z53.21: ICD-10-CM

## 2017-12-10 DIAGNOSIS — K21.9: ICD-10-CM

## 2017-12-10 DIAGNOSIS — Z88.5: ICD-10-CM

## 2017-12-10 DIAGNOSIS — M54.9: Primary | ICD-10-CM

## 2017-12-10 DIAGNOSIS — G89.29: ICD-10-CM

## 2017-12-10 DIAGNOSIS — Z76.5: ICD-10-CM

## 2017-12-10 PROCEDURE — 71010: CPT

## 2017-12-10 PROCEDURE — 99283 EMERGENCY DEPT VISIT LOW MDM: CPT

## 2017-12-10 NOTE — PD
HPI


Chief Complaint:  Back/ Neck Pain or Injury


Time Seen by Provider:  07:32


Travel History


International Travel<30 days:  No


Contact w/Intl Traveler<30days:  No


Traveled to known affect area:  No





History of Present Illness


HPI


The patient was seen and examined in the presence of the nurse.  At no point in 

time was I in the room without the nurse present.  This patient complains of 

back pain.  Duration is 4 weeks.  Location is right sided pain underneath her 

right scapula.  She denies injury.  It is not pleuritic.  She is crying and 

writhing about on the bed in fetal position.  This is The same way she 

presented last several visits.  This is her 34th visit in the last 2 years.  

She has chronic pain.  She does not see a local physician.  She denies fever or 

productive cough.  She is not short of breath.  Severity described as moderate 

to severe.





PFSH


Past Medical History


Hx Anticoagulant Therapy:  No


Arthritis:  No


Asthma:  No


Autoimmune Disease:  No


Blood Disorders:  No


Anxiety:  No


Depression:  No


Heart Rhythm Problems:  No


Cancer:  Yes (MELANOMA?)


Cardiovascular Problems:  No


High Cholesterol:  No


Chemotherapy:  No


Chest Pain:  No


Congestive Heart Failure:  No


COPD:  No


Cerebrovascular Accident:  No


Diabetes:  No


Diminished Hearing:  No


Endocrine:  No


Gastrointestinal Disorders:  Yes (GASTROPARESIS)


GERD:  Yes


Glaucoma:  No


Genitourinary:  No


Headaches:  No


Hepatitis:  No


Hiatal Hernia:  Yes


Hypertension:  No


Immune Disorder:  No


Implanted Vascular Access Dvce:  No


Kidney Stones:  No


Musculoskeletal:  No


Neurologic:  Yes


Psychiatric:  No


Reproductive:  Yes (ENDOMETRIOSIS)


Respiratory:  No


Immunizations Current:  No


Migraines:  No


Myocardial Infarction:  No


Radiation Therapy:  No


Renal Failure:  Yes (ARF)


Seizures:  No


Sickle Cell Disease:  No


Sleep Apnea:  No


Thyroid Disease:  No


Ulcer:  Yes


Tetanus Vaccination:  > 5 Years


Influenza Vaccination:  No


Pregnant?:  Not Pregnant


:  3


Para:  2


Miscarriage:  0


:  1


Ovarian Cysts:  Yes





Past Surgical History


Abdominal Surgery:  Yes (EXP LAP)


AICD:  No


Appendectomy:  No


Arteriovenous Shunt:  No


Cardiac Surgery:  No


 Section:  Yes


Cholecystectomy:  No


Ear Surgery:  No


Endocrine Surgery:  No


Eye Surgery:  No


Genitourinary Surgery:  Yes


Gynecologic Surgery:  Yes (UTERINE ABLATION: 2016)


Insulin Pump:  No


Joint Replacement:  No


Neurologic Surgery:  Yes


Oral Surgery:  No


Pacemaker:  No


Thoracic Surgery:  No


Tonsillectomy:  Yes


Other Surgery:  Yes (colon polyp rempoval)





Social History


Alcohol Use:  No (pt denies)


Tobacco Use:  No (vape)


Substance Use:  Yes (marijuana)





Allergies-Medications


(Allergen,Severity, Reaction):  


Coded Allergies:  


     amoxicillin (Unverified  Allergy, Severe, VOMITING, 17)


     morphine (Unverified  Allergy, Severe, Hives , 17)


     penicillin G (Unverified  Allergy, Severe, VOMITING, 17)


     Sulfa (Sulfonamide Antibiotics) (Unverified  Adverse Reaction, Severe, 

VOMITING, 17)


Reported Meds & Prescriptions





Reported Meds & Active Scripts


Active








Review of Systems


General / Constitutional:  No: Fever


Eyes:  No: Visual changes


HENT:  No: Headaches


Cardiovascular:  No: Chest Pain or Discomfort


Respiratory:  No: Shortness of Breath


Gastrointestinal:  No: Abdominal Pain


Genitourinary:  No: Dysuria


Musculoskeletal:  Positive: Pain


Skin:  No Rash


Neurologic:  No: Weakness


Psychiatric:  No: Depression


Endocrine:  No: Polydipsia


Hematologic/Lymphatic:  No: Easy Bruising





Physical Exam


Narrative


GENERAL: Thin cachectic highly emotional well-developed patient 


SKIN: Focused skin assessment reveals no rash and nodules. Skin is Warm and dry.


HEAD: Atraumatic. Normocephalic. 


EYES: Pupils equal and round. No scleral icterus. No injection or drainage. 


ENT: No nasal bleeding or discharge.  Mucous membranes pink and moist.


NECK: Trachea midline. No JVD. 


CARDIOVASCULAR: Regular rate and rhythm.  No murmur appreciated.


RESPIRATORY: No accessory muscle use. Clear to auscultation. Breath sounds 

equal bilaterally. 


GASTROINTESTINAL: Abdomen soft, non-tender, nondistended. Hepatic and splenic 

margins not palpable. 


MUSCULOSKELETAL: No obvious deformities. No clubbing.  No cyanosis.  No edema.  

Examination the back reveals no objective abnormality.  No tenderness or 

swelling or discoloration.


NEUROLOGICAL: Awake and alert. No obvious cranial nerve deficits.  Motor 

grossly within normal limits. Normal speech.


PSYCHIATRIC: Depressed and anxious seeming  mood and affect; insight and 

judgment questionable .





Data


Data


Last Documented VS





Vital Signs








  Date Time  Temp Pulse Resp B/P (MAP) Pulse Ox O2 Delivery O2 Flow Rate FiO2


 


12/10/17 07:28   18     


 


12/10/17 07:13 98.2 98  147/99 (115) 98   








Orders





 Orders


Ed Urine Pregnancytest Poc (12/10/17 07:32)


Chest, Single Ap (12/10/17 )








MDM


Medical Decision Making


Medical Screen Exam Complete:  Yes


Emergency Medical Condition:  Yes


Medical Record Reviewed:  Yes


Differential Diagnosis


Drug-seeking behavior, muscle strain, pulmonary lesion


Narrative Course


I have reviewed the patient's electronic medical record.  She's been here 34 

times in last 2 years, most of them for abdominal pain.  Was diagnosed with drug

-seeking behavior the last 2 visits.





Urine pregnancy was ordered but she never did it


I reviewed her chest x-ray which was normal





Patient apparently got dressed and snuck out of the emergency department and 

never said anything to anybody.  She is AMA.  Presentation is consistent with 

malingering and drug-seeking behavior as well.





Diagnosis





 Primary Impression:  


 Drug-seeking behavior


 Additional Impression:  


 Malingering


Disposition:  07 AGAINST MEDICAL ADVICE











Rasheed Pyle MD Dec 10, 2017 07:40

## 2017-12-10 NOTE — RADRPT
EXAM DATE/TIME:  12/10/2017 07:40 

 

[facility] COMPARISON:     

CHEST PA & LAT, September 16, 2016, 6:17.

 

                     

INDICATIONS :     

Lower back pain, shortness of breath started today.

                     

 

MEDICAL HISTORY :     

Gastroparesis.          

 

SURGICAL HISTORY :     

Hysterectomy.   

 

ENCOUNTER:     

Initial                                        

 

ACUITY:     

1 day      

 

PAIN SCORE:     

0/10

 

LOCATION:     

Bilateral chest 

 

FINDINGS:     

A single view of the chest demonstrates the lungs to be symmetrically aerated without evidence of mas
s, infiltrate or effusion.  The cardiomediastinal contours are unremarkable.  Osseous structures are 
intact.

 

CONCLUSION:     

No evidence of acute cardiopulmonary disease.

 

 

 

 Randy Zhang MD on December 10, 2017 at 7:51           

Board Certified Radiologist.

 This report was verified electronically.

## 2018-06-18 ENCOUNTER — HOSPITAL ENCOUNTER (EMERGENCY)
Dept: HOSPITAL 17 - NEPC | Age: 38
Discharge: HOME | End: 2018-06-18
Payer: SELF-PAY

## 2018-06-18 ENCOUNTER — HOSPITAL ENCOUNTER (EMERGENCY)
Dept: HOSPITAL 17 - NEPD | Age: 38
LOS: 1 days | Discharge: HOME | End: 2018-06-19
Payer: COMMERCIAL

## 2018-06-18 VITALS
OXYGEN SATURATION: 98 % | DIASTOLIC BLOOD PRESSURE: 65 MMHG | HEART RATE: 96 BPM | TEMPERATURE: 99 F | RESPIRATION RATE: 18 BRPM | SYSTOLIC BLOOD PRESSURE: 91 MMHG

## 2018-06-18 VITALS
OXYGEN SATURATION: 95 % | RESPIRATION RATE: 20 BRPM | DIASTOLIC BLOOD PRESSURE: 83 MMHG | SYSTOLIC BLOOD PRESSURE: 122 MMHG | HEART RATE: 103 BPM

## 2018-06-18 VITALS
HEART RATE: 85 BPM | OXYGEN SATURATION: 98 % | SYSTOLIC BLOOD PRESSURE: 122 MMHG | RESPIRATION RATE: 20 BRPM | DIASTOLIC BLOOD PRESSURE: 83 MMHG

## 2018-06-18 VITALS
HEART RATE: 99 BPM | DIASTOLIC BLOOD PRESSURE: 82 MMHG | TEMPERATURE: 98.6 F | RESPIRATION RATE: 16 BRPM | OXYGEN SATURATION: 97 % | SYSTOLIC BLOOD PRESSURE: 133 MMHG

## 2018-06-18 VITALS
SYSTOLIC BLOOD PRESSURE: 97 MMHG | DIASTOLIC BLOOD PRESSURE: 58 MMHG | OXYGEN SATURATION: 94 % | HEART RATE: 83 BPM | RESPIRATION RATE: 16 BRPM

## 2018-06-18 VITALS — HEART RATE: 89 BPM | RESPIRATION RATE: 16 BRPM | OXYGEN SATURATION: 96 %

## 2018-06-18 VITALS
SYSTOLIC BLOOD PRESSURE: 141 MMHG | DIASTOLIC BLOOD PRESSURE: 83 MMHG | RESPIRATION RATE: 18 BRPM | HEART RATE: 71 BPM | OXYGEN SATURATION: 97 %

## 2018-06-18 VITALS — WEIGHT: 110.23 LBS | BODY MASS INDEX: 19.53 KG/M2 | HEIGHT: 63 IN

## 2018-06-18 VITALS — BODY MASS INDEX: 18.63 KG/M2 | HEIGHT: 63 IN | WEIGHT: 105.16 LBS

## 2018-06-18 DIAGNOSIS — R10.9: ICD-10-CM

## 2018-06-18 DIAGNOSIS — G89.29: Primary | ICD-10-CM

## 2018-06-18 DIAGNOSIS — Z88.0: ICD-10-CM

## 2018-06-18 DIAGNOSIS — F12.188: ICD-10-CM

## 2018-06-18 DIAGNOSIS — N80.9: ICD-10-CM

## 2018-06-18 DIAGNOSIS — D72.829: ICD-10-CM

## 2018-06-18 DIAGNOSIS — F43.20: Primary | ICD-10-CM

## 2018-06-18 DIAGNOSIS — Z88.5: ICD-10-CM

## 2018-06-18 DIAGNOSIS — B95.8: ICD-10-CM

## 2018-06-18 DIAGNOSIS — Z88.2: ICD-10-CM

## 2018-06-18 DIAGNOSIS — F43.21: ICD-10-CM

## 2018-06-18 DIAGNOSIS — K21.9: ICD-10-CM

## 2018-06-18 DIAGNOSIS — Z76.5: ICD-10-CM

## 2018-06-18 DIAGNOSIS — G89.29: ICD-10-CM

## 2018-06-18 DIAGNOSIS — Z85.820: ICD-10-CM

## 2018-06-18 DIAGNOSIS — M54.9: ICD-10-CM

## 2018-06-18 DIAGNOSIS — Z72.0: ICD-10-CM

## 2018-06-18 DIAGNOSIS — N19: ICD-10-CM

## 2018-06-18 LAB
ALBUMIN SERPL-MCNC: 5.7 GM/DL (ref 3.4–5)
ALP SERPL-CCNC: 70 U/L (ref 45–117)
ALT SERPL-CCNC: 18 U/L (ref 10–53)
AST SERPL-CCNC: 15 U/L (ref 15–37)
BASOPHILS # BLD AUTO: 0 TH/MM3 (ref 0–0.2)
BASOPHILS NFR BLD: 0.2 % (ref 0–2)
BILIRUB SERPL-MCNC: 1 MG/DL (ref 0.2–1)
BUN SERPL-MCNC: 20 MG/DL (ref 7–18)
CALCIUM SERPL-MCNC: 10.8 MG/DL (ref 8.5–10.1)
CHLORIDE SERPL-SCNC: 95 MEQ/L (ref 98–107)
CREAT SERPL-MCNC: 1.46 MG/DL (ref 0.5–1)
EOSINOPHIL # BLD: 0 TH/MM3 (ref 0–0.4)
EOSINOPHIL NFR BLD: 0 % (ref 0–4)
ERYTHROCYTE [DISTWIDTH] IN BLOOD BY AUTOMATED COUNT: 13.1 % (ref 11.6–17.2)
GFR SERPLBLD BASED ON 1.73 SQ M-ARVRAT: 40 ML/MIN (ref 89–?)
GLUCOSE SERPL-MCNC: 125 MG/DL (ref 74–106)
GLUCOSE UR STRIP-MCNC: 50 MG/DL
HCO3 BLD-SCNC: 23.8 MEQ/L (ref 21–32)
HCT VFR BLD CALC: 50.4 % (ref 35–46)
HGB BLD-MCNC: 17.3 GM/DL (ref 11.6–15.3)
HGB UR QL STRIP: (no result)
HYALINE CASTS #/AREA URNS LPF: 19 /LPF
KETONES UR STRIP-MCNC: (no result) MG/DL
LACTIC ACID SEPSIS PROTOCOL: 3.4 MMOL/L (ref 0.4–2)
LYMPHOCYTES # BLD AUTO: 0.8 TH/MM3 (ref 1–4.8)
LYMPHOCYTES NFR BLD AUTO: 4.5 % (ref 9–44)
MCH RBC QN AUTO: 31.4 PG (ref 27–34)
MCHC RBC AUTO-ENTMCNC: 34.4 % (ref 32–36)
MCV RBC AUTO: 91.4 FL (ref 80–100)
MONOCYTE #: 0.7 TH/MM3 (ref 0–0.9)
MONOCYTES NFR BLD: 3.8 % (ref 0–8)
MUCOUS THREADS #/AREA URNS LPF: (no result) /LPF
NEUTROPHILS # BLD AUTO: 16 TH/MM3 (ref 1.8–7.7)
NEUTROPHILS NFR BLD AUTO: 91.5 % (ref 16–70)
NITRITE UR QL STRIP: (no result)
PLATELET # BLD: 284 TH/MM3 (ref 150–450)
PMV BLD AUTO: 8.6 FL (ref 7–11)
PROT SERPL-MCNC: 10.2 GM/DL (ref 6.4–8.2)
RBC # BLD AUTO: 5.51 MIL/MM3 (ref 4–5.3)
SODIUM SERPL-SCNC: 133 MEQ/L (ref 136–145)
SP GR UR STRIP: 1.03 (ref 1–1.03)
SQUAMOUS #/AREA URNS HPF: 3 /HPF (ref 0–5)
URINE LEUKOCYTE ESTERASE: (no result)
WBC # BLD AUTO: 17.5 TH/MM3 (ref 4–11)

## 2018-06-18 PROCEDURE — 96361 HYDRATE IV INFUSION ADD-ON: CPT

## 2018-06-18 PROCEDURE — 96372 THER/PROPH/DIAG INJ SC/IM: CPT

## 2018-06-18 PROCEDURE — 87205 SMEAR GRAM STAIN: CPT

## 2018-06-18 PROCEDURE — 80307 DRUG TEST PRSMV CHEM ANLYZR: CPT

## 2018-06-18 PROCEDURE — 96374 THER/PROPH/DIAG INJ IV PUSH: CPT

## 2018-06-18 PROCEDURE — 96375 TX/PRO/DX INJ NEW DRUG ADDON: CPT

## 2018-06-18 PROCEDURE — P9612 CATHETERIZE FOR URINE SPEC: HCPCS

## 2018-06-18 PROCEDURE — 81001 URINALYSIS AUTO W/SCOPE: CPT

## 2018-06-18 PROCEDURE — 86403 PARTICLE AGGLUT ANTBDY SCRN: CPT

## 2018-06-18 PROCEDURE — 80053 COMPREHEN METABOLIC PANEL: CPT

## 2018-06-18 PROCEDURE — 85025 COMPLETE CBC W/AUTO DIFF WBC: CPT

## 2018-06-18 PROCEDURE — 99283 EMERGENCY DEPT VISIT LOW MDM: CPT

## 2018-06-18 PROCEDURE — 83605 ASSAY OF LACTIC ACID: CPT

## 2018-06-18 PROCEDURE — 99285 EMERGENCY DEPT VISIT HI MDM: CPT

## 2018-06-18 PROCEDURE — 71045 X-RAY EXAM CHEST 1 VIEW: CPT

## 2018-06-18 PROCEDURE — 87040 BLOOD CULTURE FOR BACTERIA: CPT

## 2018-06-18 PROCEDURE — 74177 CT ABD & PELVIS W/CONTRAST: CPT

## 2018-06-18 PROCEDURE — 84703 CHORIONIC GONADOTROPIN ASSAY: CPT

## 2018-06-18 RX ADMIN — ONDANSETRON PRN MG: 4 TABLET, ORALLY DISINTEGRATING ORAL at 22:32

## 2018-06-18 RX ADMIN — DICYCLOMINE HYDROCHLORIDE ONE MG: 10 CAPSULE ORAL at 22:00

## 2018-06-18 RX ADMIN — METOCLOPRAMIDE HYDROCHLORIDE ONE MG: 10 TABLET ORAL at 22:00

## 2018-06-18 NOTE — RADRPT
EXAM DATE:  2018 4:16 PM EDT

AGE/SEX:        37 years / Female



INDICATIONS:  Abdominal pain.



CLINICAL DATA:  This is the patient's initial encounter. Patient reports that signs and symptoms have
 been present for 1 day and indicates a pain score of 4/10. 

                                                                          

MEDICAL/SURGICAL HISTORY:       Gastroparesis.  Ulcers. Hernia, endometriosis, .  Hysterectom
y.  Cholecystectomy.



ORAL CONTRAST:   Prescribed oral contrast ingested.



RADIATION DOSE:  4.52 CTDI (mGy)









COMPARISON:      [facility], [place] ABDOMEN & PELVIS W CONTRAST, 2017.  .



TECHNIQUE:  Multiple contiguous axial images were obtained through the abdomen and pelvis following b
olus infusion of  50 ml Visipaque 320 (iodixanol)  nonionic water-soluble contrast as a single exam d
ose. Prescribed oral contrast ingested.  Using automated exposure control and adjustment of the mA an
d/or kV according to patient size, the radiation dose was kept as low as reasonably achievable to obt
ain optimal diagnostic quality images.



FINDINGS: 

The lower lungs are clear.

Liver and spleen are unremarkable

Pancreas and adrenals appear normal

Gallbladder is unremarkable

Symmetrical renal function without renal mass

Portions of the colon visualized in the abdomen is unremarkable

In the pelvis there is a 3.4 similar cystic mass in the right adnexa region. The left adnexa region i
s unremarkable

The uterus appears normal

There is no free fluid

Multiple phlebolith are present in the pelvis

The abdominal walls intact

There is no ascites or adenopathy.



CONCLUSION:

1.  Stable cystic mass in the right adnexa region.

2.  There is no free fluid.

3.  I do not see any other etiology for the abdominal pain.



Electronically signed by: Sherwin White MD  2018 4:32 PM EDT

## 2018-06-18 NOTE — PD
HPI


.


Back and abdominal pain


Chief Complaint:  Suicide Ideation/Attempt


Time Seen by Provider:  12:13


Travel History


International Travel<30 days:  No


Contact w/Intl Traveler<30days:  No


Traveled to known affect area:  No





History of Present Illness


HPI


This patient presents with a chief complaint of back and abdominal pain.  She 

has a family member here with her.  The majority of the history is obtained 

from the family member.  She has had chronic abdominal pain for 20+ years.  The 

etiology of her abdominal pain is attributed to endometriosis.  She has had 

uterine ablation.  She has also had an appendectomy.  Despite this, she 

continues to have episodic abdominal and back pain.  The patient reports that 

the only thing that helps her is Dilaudid.  Her associated symptoms include 

poor appetite, nausea and vomiting.  She states that her chronic pain is 

"driving her crazy."  The pain is described as severe.





She was noted to have some changes on her back.  Her family member reports that 

these are chronic.  The skin changes have been noted at [facility].  The etiology 

is unknown.





PFSH


Past Medical History


Hx Anticoagulant Therapy:  No


Arthritis:  No


Asthma:  No


Autoimmune Disease:  No


Blood Disorders:  No


Anxiety:  No


Depression:  No


Heart Rhythm Problems:  No


Cancer:  Yes (MELANOMA?)


Cardiovascular Problems:  No


High Cholesterol:  No


Chemotherapy:  No


Chest Pain:  No


Congestive Heart Failure:  No


COPD:  No


Cerebrovascular Accident:  No


Diabetes:  No


Diminished Hearing:  No


Endocrine:  No


Gastrointestinal Disorders:  Yes (GASTROPARESIS)


GERD:  Yes


Glaucoma:  No


Genitourinary:  No


Headaches:  No


Hepatitis:  No


Hiatal Hernia:  Yes


Hypertension:  No


Immune Disorder:  No


Implanted Vascular Access Dvce:  No


Kidney Stones:  No


Musculoskeletal:  No


Neurologic:  Yes


Psychiatric:  No


Reproductive:  Yes (ENDOMETRIOSIS)


Respiratory:  No


Immunizations Current:  No


Migraines:  No


Myocardial Infarction:  No


Radiation Therapy:  No


Renal Failure:  Yes (ARF)


Seizures:  No


Sickle Cell Disease:  No


Sleep Apnea:  No


Thyroid Disease:  No


Ulcer:  Yes


Tetanus Vaccination:  > 5 Years


Influenza Vaccination:  No


Pregnant?:  Not Pregnant


LMP:  2016


:  3


Para:  2


Miscarriage:  0


:  2


Ectopic Pregnancy:  No


Ovarian Cysts:  No


Dilation and Curettage (D&C):  No


Tubal Ligation:  No





Past Surgical History


Abdominal Surgery:  Yes (EXP LAP)


AICD:  No


Appendectomy:  No


Arteriovenous Shunt:  No


Cardiac Surgery:  No


 Section:  Yes


Cholecystectomy:  Yes


Ear Surgery:  No


Endocrine Surgery:  No


Eye Surgery:  No


Genitourinary Surgery:  Yes


Gynecologic Surgery:  Yes (ABLATION)


Hysterectomy:  No (ABLATION)


Insulin Pump:  No


Joint Replacement:  No


Neurologic Surgery:  Yes


Oral Surgery:  No


Pacemaker:  No


Thoracic Surgery:  No


Tonsillectomy:  Yes


Other Surgery:  Yes (RECONSTRUCTIVE FACIAL SURGERY )





Social History


Alcohol Use:  No


Tobacco Use:  Yes


Substance Use:  Yes (MARIJUANA, THIS MORNING AROUND 5AM)





Allergies-Medications


(Allergen,Severity, Reaction):  


Coded Allergies:  


     amoxicillin (Verified  Allergy, Severe, VOMITING, 18)


     morphine (Verified  Allergy, Severe, Hives , 18)


     penicillin G (Verified  Allergy, Severe, VOMITING, 18)


     Sulfa (Sulfonamide Antibiotics) (Verified  Adverse Reaction, Severe, 

VOMITING, 18)


Reported Meds & Prescriptions





Reported Meds & Active Scripts


Active


Reported


Zofran (Ondansetron HCl) 4 Mg Tab 4 Mg PO Q8HR PRN








Review of Systems


Except as stated in HPI:  all other systems reviewed are Neg





Physical Exam


Narrative


GENERAL: Thin, chronically ill-appearing woman who is writhing on the


SKIN:  warm/dry.  She has darkened, mottled skin on her posterior torso.  The 

skin is warm and has good capillary refill.


HEAD: Normocephalic. Atraumatic.


EYES: Pupils equal and round.  Extraocular movements are intact.


ENT:  Mucous membranes pink and moist.  Extremely poor dentition.


NECK: Supple.  Full range of motion without pain.. 


CARDIOVASCULAR: Regular rate and rhythm.  Heart sounds were normal.


RESPIRATORY: No accessory muscle use. Clear to auscultation. Breath sounds 

equal bilaterally. 


GASTROINTESTINAL: Abdomen soft.  Diffuse tenderness.  Bowel sounds present.  

Nondistended.


MUSCULOSKELETAL: No obvious deformities.  Normal muscle tone.


NEUROLOGICAL: Awake and alert. No obvious cranial nerve deficits.  Motor 

grossly within normal limits. Normal speech.


PSYCHIATRIC: Histrionic.  Yelling "help me."





Data


Data


Last Documented VS





Vital Signs








  Date Time  Temp Pulse Resp B/P (MAP) Pulse Ox O2 Delivery O2 Flow Rate FiO2


 


18 15:44  71 18 141/83 (102) 97 Room Air  


 


18 10:44 98.6       








Orders





 Orders


Sepsis Workup Initiated (18 )


Complete Blood Count With Diff (18 12:25)


Comprehensive Metabolic Panel (18 12:25)


Lactic Acid Sepsis Protocol (18 12:25)


Urinalysis - C+S If Indicated (18 12:25)


Blood Culture (18 12:25)


Chest, Single Ap (18 12:25)


Blood Glucose (18 12:25)


Ecg Monitoring (18 12:25)


Iv Access Insert/Monitor (18 12:25)


Cath For Specimen (18 12:25)


Oximetry (18 12:25)


Oxygen Administration (18 12:25)


Ct Abd/Pel W Iv Contrast(Rout) (18 12:25)


Sodium Chlor 0.9% 1000 Ml Inj (Ns 1000 M (18 12:25)


Sodium Chlor 0.9% 1000 Ml Inj (Ns 1000 M (18 12:25)


Ed Urine Pregnancytest Poc (18 12:25)


Lorazepam Inj (Ativan Inj) (18 12:30)


Diphenhydramine Inj (Benadryl Inj) (18 12:30)


Prochlorperazine Inj (Compazine Inj) (18 12:30)


Drug Screen, Random Urine (18 12:25)


Iodixanol 320 Inj (Rad Ct) (Visipaque 32 (18 16:12)


Ed Discharge Order (18 19:01)





Labs





Laboratory Tests








Test


  18


12:32 18


13:17 18


15:46 18


17:45


 


White Blood Count 17.5 TH/MM3    


 


Red Blood Count 5.51 MIL/MM3    


 


Hemoglobin 17.3 GM/DL    


 


Hematocrit 50.4 %    


 


Mean Corpuscular Volume 91.4 FL    


 


Mean Corpuscular Hemoglobin 31.4 PG    


 


Mean Corpuscular Hemoglobin


Concent 34.4 % 


  


  


  


 


 


Red Cell Distribution Width 13.1 %    


 


Platelet Count 284 TH/MM3    


 


Mean Platelet Volume 8.6 FL    


 


Neutrophils (%) (Auto) 91.5 %    


 


Lymphocytes (%) (Auto) 4.5 %    


 


Monocytes (%) (Auto) 3.8 %    


 


Eosinophils (%) (Auto) 0.0 %    


 


Basophils (%) (Auto) 0.2 %    


 


Neutrophils # (Auto) 16.0 TH/MM3    


 


Lymphocytes # (Auto) 0.8 TH/MM3    


 


Monocytes # (Auto) 0.7 TH/MM3    


 


Eosinophils # (Auto) 0.0 TH/MM3    


 


Basophils # (Auto) 0.0 TH/MM3    


 


CBC Comment DIFF FINAL    


 


Differential Comment     


 


Blood Urea Nitrogen 20 MG/DL    


 


Creatinine 1.46 MG/DL    


 


Random Glucose 125 MG/DL    


 


Total Protein 10.2 GM/DL    


 


Albumin 5.7 GM/DL    


 


Calcium Level 10.8 MG/DL    


 


Alkaline Phosphatase 70 U/L    


 


Aspartate Amino Transf


(AST/SGOT) 15 U/L 


  


  


  


 


 


Alanine Aminotransferase


(ALT/SGPT) 18 U/L 


  


  


  


 


 


Total Bilirubin 1.0 MG/DL    


 


Sodium Level 133 MEQ/L    


 


Potassium Level 3.4 MEQ/L    


 


Chloride Level 95 MEQ/L    


 


Carbon Dioxide Level 23.8 MEQ/L    


 


Anion Gap 14 MEQ/L    


 


Estimat Glomerular Filtration


Rate 40 ML/MIN 


  


  


  


 


 


Lactic Acid Level  3.4 mmol/L   2.8 mmol/L 


 


Urine Color   Rashida  


 


Urine Turbidity   HAZY  


 


Urine pH   5.0  


 


Urine Specific Gravity   1.034  


 


Urine Protein   >=500 mg/dL  


 


Urine Glucose (UA)   50 mg/dL  


 


Urine Ketones   TRACE mg/dL  


 


Urine Occult Blood   SMALL  


 


Urine Nitrite   NEG  


 


Urine Bilirubin   NEG  


 


Urine Urobilinogen   2.0 mg/dL  


 


Urine Leukocyte Esterase   NEG  


 


Urine RBC   1 /hpf  


 


Urine WBC   1 /hpf  


 


Urine Squamous Epithelial


Cells 


  


  3 /hpf 


  


 


 


Urine Hyaline Casts   19 /lpf  


 


Urine Mucus   MANY /lpf  


 


Microscopic Urinalysis Comment


  


  


  CULT NOT


INDICATED 


 


 


Urine Opiates Screen   NEG  


 


Urine Barbiturates Screen   NEG  


 


Urine Amphetamines Screen   NEG  


 


Urine Benzodiazepines Screen   NEG  


 


Urine Cocaine Screen   NEG  


 


Urine Cannabinoids Screen   POS  











MDM


Medical Decision Making


Medical Screen Exam Complete:  Yes


Emergency Medical Condition:  Yes


Medical Record Reviewed:  Yes (This patient has had numerous previous visits to 

the emergency department.  She has left here several times AMA.  She has been 

diagnosed in the past with drug-seeking behavior.)


Differential Diagnosis


Differential diagnosis of abdominal pain includes but is not limited to 

gastritis, pancreatitis, hepatitis, gastroenteritis, constipation, urinary 

retention, peptic ulcer disease, diverticulitis or appendicitis


Narrative Course


This patient presents with exacerbation of chronic abdominal and back pain.





E-Forcse has been queried.  She has received at least 5 different narcotic 

prescriptions from 5 different providers in the past year.





CBC & BMP Diagram


18 12:32








Total Protein 10.2 H, Albumin 5.7 H, Calcium Level 10.8 H, Alkaline Phosphatase 

70, Aspartate Amino Transf (AST/SGOT) 15, Alanine Aminotransferase (ALT/SGPT) 18

, Total Bilirubin 1.0





UA is contaminated.





Lactic acid 3.4.  She is receiving a 2 L fluid bolus.  Her lactic acid was in 

for recheck.





Repeat lactic acid is 2.8.





She meets SIRS criteria but not septic criteria.  She has no source.








Last Impressions








Chest X-Ray 18 1225 Signed





Impressions: 





 CONCLUSION: 





 Negative examination.





  





 


 


Abdomen/Pelvis CT 18 Signed





Impressions: 





 CONCLUSION:





 1.  Stable cystic mass in the right adnexa region.





 2.  There is no free fluid.





 3.  I do not see any other etiology for the abdominal pain.





  





 








I suspect that she is withdrawing from opiates.





Drug screen is positive only for THC.





She will be discharged home with instructions to follow-up with her primary 

care provider or with pain management for ongoing treatment of her chronic 

abdominal pain.





Sepsis Criteria


SIRS Criteria (2 or more):  Heart rate over 90, WBC > 34055, < 4000 or > 10% 

bands


Severe Sepsis (+one):  Lactate >2


Criteria Outcome:  Meets SIRS criteria





Diagnosis





 Primary Impression:  


 Chronic abdominal pain


 Additional Impressions:  


 Drug-seeking behavior


 Leukocytosis


 Qualified Codes:  D72.829 - Elevated white blood cell count, unspecified


Patient Instructions:  Chronic Abdominal Pain (ED), General Instructions





***Additional Instructions:  


Follow-up with your primary care provider for ongoing treatment of your chronic 

pain


Disposition:  01 DISCHARGE HOME


Condition:  Stable











Ramandeep Ireland MD 2018 13:38

## 2018-06-18 NOTE — RADRPT
EXAM DATE:  6/18/2018 2:16 PM EDT

AGE/SEX:        37 years / Female



INDICATIONS:  Shortness of breath.



CLINICAL DATA:  This is the patient's initial encounter. Patient reports that signs and symptoms have
 been present for 1 day and indicates a pain score of Nonresponsive. 

                                                                          

MEDICAL/SURGICAL HISTORY:       Non-responsive. Non-responsive.



[place]      [facility] [place]  . 





FINDINGS:  

A single AP view of the chest demonstrates the lungs to be symmetrically aerated without evidence of 
mass, infiltrate or effusion.  The cardiomediastinal contours are unremarkable.  Osseous structures a
re intact.  





CONCLUSION: 

Negative examination.



Electronically signed by: Carlos White MD  6/18/2018 2:22 PM EDT

## 2018-06-19 VITALS
HEART RATE: 86 BPM | RESPIRATION RATE: 16 BRPM | OXYGEN SATURATION: 99 % | DIASTOLIC BLOOD PRESSURE: 64 MMHG | SYSTOLIC BLOOD PRESSURE: 121 MMHG

## 2018-06-19 VITALS
SYSTOLIC BLOOD PRESSURE: 140 MMHG | OXYGEN SATURATION: 100 % | HEART RATE: 82 BPM | RESPIRATION RATE: 16 BRPM | DIASTOLIC BLOOD PRESSURE: 87 MMHG

## 2018-06-19 RX ADMIN — DICYCLOMINE HYDROCHLORIDE ONE MG: 10 CAPSULE ORAL at 00:41

## 2018-06-19 RX ADMIN — ONDANSETRON PRN MG: 4 TABLET, ORALLY DISINTEGRATING ORAL at 05:04

## 2018-06-19 RX ADMIN — METOCLOPRAMIDE HYDROCHLORIDE ONE MG: 10 TABLET ORAL at 00:42

## 2018-06-19 NOTE — PD
HPI


Chief Complaint:  Psychiatric Symptoms


Time Seen by Provider:  21:21


Travel History


International Travel<30 days:  No


Contact w/Intl Traveler<30days:  No


Traveled to known affect area:  No





History of Present Illness


HPI


Is a 37-year-old woman, history of chronic initially here with the same, 

extensive workup revealed a negative CT scan, some leukocytosis, was 

discharged.  Patient states that she was suicidal because of the severe pain 

that she was in and her sister brought her back for psychiatric evaluation.  

Extensive workup for the abdominal pain in the past.  She was treated for 

endometriosis.  She does smoke marijuana heavily, daily, and treats her 

symptoms with scalding hot water to her back all suggestive of marijuana 

hyperemesis syndrome.  She has been hospitalized once when she made suicidal 

statements briefly.  She otherwise has never been treated for depression.





History


Past Medical History


*** Narrative Medical


Acute on chronic abdominal pain


Tetanus Vaccination:  > 5 Years


Influenza Vaccination:  Yes


:  3


Para:  2


Dilation and Curettage (D&C):  No





Social History


Alcohol Use:  No


Tobacco Use:  Yes





Allergies-Medications


(Allergen,Severity, Reaction):  


Coded Allergies:  


     amoxicillin (Verified  Allergy, Severe, VOMITING, 18)


     morphine (Verified  Allergy, Severe, Hives , 18)


     penicillin G (Verified  Allergy, Severe, VOMITING, 18)


     Sulfa (Sulfonamide Antibiotics) (Verified  Adverse Reaction, Severe, 

VOMITING, 18)


Reported Meds & Prescriptions





Reported Meds & Active Scripts


Active


Reported


Zofran (Ondansetron HCl) 4 Mg Tab 4 Mg PO Q8HR PRN








Review of Systems


Except as stated in HPI:  all other systems reviewed are Neg





Physical Exam


Narrative


GENERAL: 37-year-old woman, pleasant.  Intermittently gets fairly histrionic 

with the screaming crying, but then subsides and is pleasant and easy to talk 

to.


SKIN: Focused skin assessment warm/dry.


HEAD: Atraumatic. Normocephalic. 


EYES: Pupils equal and round. No scleral icterus. No injection or drainage. 


ENT: No nasal bleeding or discharge.  Mucous membranes pink and moist.


NECK: Trachea midline. No JVD. 


CARDIOVASCULAR: Regular rate and rhythm.  No murmur appreciated.


RESPIRATORY: No accessory muscle use. Clear to auscultation. Breath sounds 

equal bilaterally. 


GASTROINTESTINAL: Abdomen soft, non-tender, nondistended. Hepatic and splenic 

margins not palpable. 


MUSCULOSKELETAL: No obvious deformities. No clubbing.  No cyanosis.  No edema. 


NEUROLOGICAL: Awake and alert. No obvious cranial nerve deficits.  Motor 

grossly within normal limits. Normal speech.


PSYCHIATRIC: Sad and tearful, especially when talking about her children.  

States passive suicidal ideations.





Data


Data


Last Documented VS





Vital Signs








  Date Time  Temp Pulse Resp B/P (MAP) Pulse Ox O2 Delivery O2 Flow Rate FiO2


 


18 00:45  82 16 140/87 (104) 100 Room Air  


 


18 20:18 99.0       








Orders





 Orders


Dicyclomine (Bentyl) (18 22:00)


Lorazepam (Ativan) (18 22:00)


Metoclopramide (Reglan) (18 22:00)


Psych Screen (18 21:47)


Dicyclomine (Bentyl) (18 09:00)


Ondansetron  Odt (Zofran  Odt) (18 22:00)


Lorazepam Inj (Ativan Inj) (18 22:15)








MDM


Medical Decision Making


Medical Screen Exam Complete:  Yes


Emergency Medical Condition:  Yes


Differential Diagnosis


Chronic pain, adjustment reaction, substance-induced mood disorder, suicidality

, other


Narrative Course


Medical decision making





37-year-old with chronic pain, etiology unclear.  She is treated for 

endometriosis.  Several symptoms are strongly suggestive of marijuana hyper is 

a syndrome.  Discussed this explicitly with the patient.  She states she tried 

a 30 day period of abstinence in the past and did not really help.  Nonetheless 

she is here now because she is having increased suicidal ideation depression 

related to her chronic pain.  Sister commits her to come back to the emergency 

part for psychiatric evaluation.  They have a third sister who is apparently 

being treated for severe substance abuse problems were related to opiates.  

Explicitly discussed with the patient will use any parenteral opiates for her 

chronic abdominal pain.  I placed him standing orders for her symptom control.  

She does not meet criteria for involuntary evaluation.  Recommend voluntary 

evaluation, psych to see, medically clear.





Diagnosis





 Primary Impression:  


 Adjustment reaction


 Additional Impression:  


 Cannabis hyperemesis syndrome concurrent with and due to cannabis abuse











Pérez Blackburn MD 2018 00:58

## 2018-06-19 NOTE — PD
__________________________________________________





History of Present Illness


Chief Complaint:  Psychiatric Symptoms


Time Seen by Provider:  10:15


Travel History


International Travel<30 Days:  No


Contact w/Intl Traveler<30days:  No


Known affected area:  No





Legal Status


Legal Status:  Voluntary


History of Present Illness:


History of Present Illness


HPI


This is a 37-year-old woman, history of chronic pain, no previous  psychiatric 

history  initially here with complaints of abdominal pain , extensive workup 

revealed a negative CT scan, some leukocytosis, was discharged.  Patient states 

that she was suicidal because of the severe pain that she was in and her sister 

brought her back for psychiatric evaluation.  Extensive workup for the 

abdominal pain in the past.  She was treated for endometriosis.  She does smoke 

marijuana heavily, daily, and treats her symptoms with scalding hot water to 

her back all suggestive of marijuana hyperemesis syndrome.  She has been 

hospitalized once when she made suicidal statements briefly.  She otherwise has 

never been treated for depression or  any other  psychiatric illness.


Patient is  seen.Patient is alert and oriented. Verbal and engaging. Speech is 

clear and logical. Patient  denies current suicidal ideation. She has a set of  

seven year old  twins.There is no psychosis and no fang.  She would  like to 

be able to get  treatment for her chronic  pain. Unfortunately she has no 

insurance. She has never been treated at [facility] or at [facility]. She is 

interested in  these referrals. Will have  meet  with her.





[facility]


Past Medical History


Hx Anticoagulant Therapy:  No


Arthritis:  No


Asthma:  No


Autoimmune Disease:  No


Blood Disorders:  No


Anxiety:  No


Depression:  No


Heart Rhythm Problems:  No


Cancer:  Yes (MELANOMA?)


Cardiovascular Problems:  No


High Cholesterol:  No


Chemotherapy:  No


Chest Pain:  No


Congestive Heart Failure:  No


COPD:  No


Cerebrovascular Accident:  No


Diabetes:  No


Diminished Hearing:  No


Endocrine:  No


Gastrointestinal Disorders:  Yes (GASTROPARESIS)


GERD:  Yes


Glaucoma:  No


Genitourinary:  No


Headaches:  No


Hepatitis:  No


Hiatal Hernia:  Yes


Heparin Induced Thrombocytopen:  No


Hypertension:  No


Immune Disorder:  No


Implanted Vascular Access Dvce:  No


Kidney Stones:  No


Musculoskeletal:  No


Neurologic:  Yes


Psychiatric:  No


Reproductive:  Yes (ENDOMETRIOSIS)


Respiratory:  No


Immunizations Current:  Yes


Migraines:  No


Myocardial Infarction:  No


Radiation Therapy:  No


Renal Failure:  Yes (ARF)


Seizures:  No


Sickle Cell Disease:  No


Sleep Apnea:  No


Thyroid Disease:  No


Ulcer:  Yes


Tetanus Vaccination:  > 5 Years


Influenza Vaccination:  Yes


Pregnant?:  Unknown


:  3


Para:  2


Miscarriage:  0


:  2


Ectopic Pregnancy:  No


Ovarian Cysts:  No


Dilation and Curettage (D&C):  No


Tubal Ligation:  No





Past Surgical History


Abdominal Surgery:  Yes (EXP LAP)


AICD:  No


Appendectomy:  No


Arteriovenous Shunt:  No


Cardiac Surgery:  No


 Section:  Yes


Cholecystectomy:  Yes


Ear Surgery:  No


Endocrine Surgery:  No


Eye Surgery:  No


Genitourinary Surgery:  Yes


Gynecologic Surgery:  Yes (ABLATION)


Hysterectomy:  No (ABLATION)


Insulin Pump:  No


Joint Replacement:  No


Neurologic Surgery:  Yes


Oral Surgery:  No


Pacemaker:  No


Thoracic Surgery:  No


Tonsillectomy:  Yes


Other Surgery:  Yes (RECONSTRUCTIVE FACIAL SURGERY )





Psychiatric History


Psychiatric History


Hx Psychiatric Treatment:  


DENIES


History of Inpatient Treatment:  No


Guns or firearms in home:  No





Social History


Lives with  and her children. Unemployed.


Hx Alcohol Use:  No


Hx Tobacco Use:  Yes


Hx Substance Use:  Yes


Substance Use Type:  Marijuana


Other Substances Used:  ER RECORDS INDICATE HISTORY OF DRUG SEEKING BEHAVIOR


Hx of Substance Use Treatment:  No





Family Psychiatric History


Negative.





Allergies-Medications


(Allergen,Severity, Reaction):  


Coded Allergies:  


     amoxicillin (Verified  Allergy, Severe, VOMITING, 18)


     morphine (Verified  Allergy, Severe, Hives , 18)


     penicillin G (Verified  Allergy, Severe, VOMITING, 18)


     Sulfa (Sulfonamide Antibiotics) (Verified  Adverse Reaction, Severe, 

VOMITING, 18)


Reported Meds & Prescriptions





Reported Meds & Active Scripts


Active


Reported


Zofran (Ondansetron HCl) 4 Mg Tab 4 Mg PO Q8HR PRN





Review of Systems


Psychiatric:  COMPLAINS OF: Depression





Mental Status Examination


Appearance:  Disheveled


Consciousness:  Alert


Orientation:  x4


Motor Activity:  Normal gait


Speech:  Unremarkable


Language:  Adequate


Fund of Knowledge:  Adequate


Attention and Concentration:  Adequate


Memory:  Unremarkable


Mood:  Anxious


Affect:  Appropriate


Thought Process & Associations:  Intact, Logical, Goal directed


Thought Content:  Appropriate


Hallucination Type:  None


Delusion Type:  None


Suicidal Ideation:  No


Suicidal Plan:  No


Suicidal Intention:  No


Homicidal Ideation:  No


Homicidal Plan:  No


Homicidal Intention:  No


Insight:  Fair


Judgment:  Adequate





MDM


Medical Decision Making


Medical Record Reviewed:  Yes


Assessment/Plan





This is a 37-year-old woman, history of chronic pain, no previous  psychiatric 

history  initially here with complaints of abdominal pain , extensive workup 

revealed a negative CT scan, some leukocytosis, was discharged.  Patient states 

that she was suicidal because of the severe pain that she was in and her sister 

brought her back for psychiatric evaluation. The  patient is not  suicidal. She 

wants  treatment for  both pain as well as  for her difficulty coping with the 

pain. She  is  future  oriented. 


Psychiatrically  clear for  discharge from ED.


Will follow up with [facility]  outpatient tomorrow.


[facility]  for medical  follow up.





Orders





 Orders


Dicyclomine (Bentyl) (18 22:00)


Lorazepam (Ativan) (18 22:00)


Metoclopramide (Reglan) (18 22:00)


Psych Screen (18 21:47)


Dicyclomine (Bentyl) (18 09:00)


Ondansetron  Odt (Zofran  Odt) (18 22:00)


Lorazepam Inj (Ativan Inj) (18 22:15)


Diet Regular Basic (18 Breakfast)





Results





Vital Signs








  Date Time  Temp Pulse Resp B/P (MAP) Pulse Ox O2 Delivery O2 Flow Rate FiO2


 


18 09:16        


 


18 05:15  86 16 121/64 (83) 99 Room Air  


 


18 00:45  82 16 140/87 (104) 100 Room Air  


 


18 23:05  89 16  96 Room Air  


 


18 20:18 99.0 96 18 91/65 (74) 98   











Diagnosis





 Primary Impression:  


 Adjustment reaction


 Additional Impression:  


 Cannabis hyperemesis syndrome concurrent with and due to cannabis abuse


Psychiatrically Cleared:  Yes


***Med/ Other Pt Specific Info:  No Meds Exist/No RX given


Disposition:  01 DISCHARGE HOME


Condition:  Stable





Problem Qualifiers








 Primary Impression:  


 Adjustment reaction


 Qualified Codes:  F43.21 - Adjustment disorder with depressed mood








Charito Maguire 2018 10:38

## 2018-06-19 NOTE — PD
Physical Exam


Date Seen by Provider:  Jun 19, 2018


Time Seen by Provider:  11:07


Narrative


For full history and physical examination please see previous providers note.





Data


Data


Last Documented VS





Vital Signs








  Date Time  Temp Pulse Resp B/P (MAP) Pulse Ox O2 Delivery O2 Flow Rate FiO2


 


6/19/18 09:16        


 


6/19/18 05:15  86 16  99 Room Air  


 


6/18/18 20:18 99.0       








Orders





 Orders


Dicyclomine (Bentyl) (6/18/18 22:00)


Lorazepam (Ativan) (6/18/18 22:00)


Metoclopramide (Reglan) (6/18/18 22:00)


Psych Screen (6/18/18 21:47)


Dicyclomine (Bentyl) (6/19/18 09:00)


Ondansetron  Odt (Zofran  Odt) (6/18/18 22:00)


Lorazepam Inj (Ativan Inj) (6/18/18 22:15)


Diet Regular Basic (6/19/18 Breakfast)


Ed Discharge Order (6/19/18 11:07)








MDM


Medical Record Reviewed:  Yes


Supervised Visit with KRISTOFER:  No


Narrative Course


Patient presented voluntarily for psychiatric evaluation.  Patient was seen and 

evaluated, medically cleared.  Patient was then evaluated by psychiatry, 

patient was diagnosed with adjustment disorder.  Patient will follow-up with 

Jose Mcwilliams, his primary doctor.  Patient stable for discharge.


Diagnosis





 Primary Impression:  


 Adjustment reaction


 Qualified Codes:  F43.20 - Adjustment disorder, unspecified


 Additional Impression:  


 Cannabis hyperemesis syndrome concurrent with and due to cannabis abuse


Referrals:  


Primary Care Physician





[facility]


Patient Instructions:  General Instructions





***Additional Instruction:  


Avoid use of illicit substances


Follow-up with Jose Mcwilliams


Follow-up with your primary doctor


Return to emergency department for any new worsening symptoms


***Med/Other Pt SpecificInfo:  No Change to Meds


Disposition:  01 DISCHARGE HOME


Condition:  Stable











Herber,Radha NICHOLAS Jun 19, 2018 11:09

## 2018-06-19 NOTE — PD
Physical Exam


Time Seen by Provider:  11:08


CRISTOPHER Castellanos has evaluated the patient, lifted the Baker act and cleared the 

patient for discharge.





Data


Data


Last Documented VS





Vital Signs








  Date Time  Temp Pulse Resp B/P (MAP) Pulse Ox O2 Delivery O2 Flow Rate FiO2


 


6/19/18 09:16        


 


6/19/18 05:15  86 16  99 Room Air  


 


6/18/18 20:18 99.0       








Orders





 Orders


Dicyclomine (Bentyl) (6/18/18 22:00)


Lorazepam (Ativan) (6/18/18 22:00)


Metoclopramide (Reglan) (6/18/18 22:00)


Psych Screen (6/18/18 21:47)


Dicyclomine (Bentyl) (6/19/18 09:00)


Ondansetron  Odt (Zofran  Odt) (6/18/18 22:00)


Lorazepam Inj (Ativan Inj) (6/18/18 22:15)


Diet Regular Basic (6/19/18 Breakfast)


Ed Discharge Order (6/19/18 11:07)








MDM


Supervised Visit with KRISTOFER:  No


[place] Course


CRISTOPHER Mcneill has evaluated the patient, lifted the Baker act and cleared the 

patient for discharge. Patient contracts safety.  Denies suicidal or homicidal 

ideations.  Patient will be provided community resource packet to /[facility] for 

follow-up.  Has friends and family for support.  Patient was medically cleared 

by alternate provider prior to psych screening.  Patient has been evaluated by 

psychiatry and and is now cleared for discharge.


Diagnosis





 Primary Impression:  


 Adjustment reaction


 Additional Impression:  


 Cannabis hyperemesis syndrome concurrent with and due to cannabis abuse


Referrals:  


[facility] (Out patient)





[facility]





Primary Care Physician





Psychiatrist





[facility]


Patient Instructions:  Cannabis Abuse (ED), General Instructions, Mood 

Disorders (ED)





***Additional Instruction:  


Contract safety to your self and others


Stop using drugs


Follow-up with psychiatry


Follow-up with primary care provider


Follow-up with Jose Ruiz


Return to the emergency department immediately with worsening of symptoms


***Med/Other Pt SpecificInfo:  No Change to Meds, No Meds Exist/No RX given


Disposition:  01 DISCHARGE HOME


Condition:  Stable











Charlotte Shoemaker Jun 19, 2018 11:09

## 2022-10-15 NOTE — PD
HPI


Chief Complaint:  GI Complaint


Time Seen by Provider:  02:48


Travel History


International Travel<30 days:  No


Contact w/Intl Traveler<30days:  No


Traveled to known affect area:  No





History of Present Illness


HPI


36-year-old female came to the emergency room with history of abdominal pain 

and vomiting.  Patient has had these complaints multiple times in the past.  

She says she has an appointment with her GYN next week.





[facility]


Past Medical History


*** Narrative Medical


List of her past medical history as reviewed from the nursing note.


Hx Anticoagulant Therapy:  No


Arthritis:  No


Asthma:  No


Autoimmune Disease:  No


Blood Disorders:  No


Anxiety:  No


Depression:  No


Heart Rhythm Problems:  No


Cancer:  No


Cardiovascular Problems:  No


High Cholesterol:  No


Chemotherapy:  No


Chest Pain:  No


Congestive Heart Failure:  No


COPD:  No


Cerebrovascular Accident:  No


Diabetes:  No


Diminished Hearing:  No


Endocrine:  No


Gastrointestinal Disorders:  Yes (GASTROPARESIS, poss. IBS, chronic abdominal 

pain)


GERD:  Yes


Glaucoma:  No


Genitourinary:  No


Headaches:  No


Hepatitis:  No


Hiatal Hernia:  Yes


Hypertension:  No


Immune Disorder:  No


Implanted Vascular Access Dvce:  No


Kidney Stones:  No


Musculoskeletal:  No


Neurologic:  Yes


Psychiatric:  No


Reproductive:  Yes (endometriosis)


Respiratory:  No


Immunizations Current:  No


Migraines:  No


Myocardial Infarction:  No


Radiation Therapy:  No


Seizures:  No


Sickle Cell Disease:  No


Sleep Apnea:  No


Thyroid Disease:  No


Ulcer:  Yes


Pregnant?:  Not Pregnant


LMP:  FEB>ABLATION


:  3


Para:  2


Miscarriage:  0


:  1


Ovarian Cysts:  Yes





Past Surgical History


Abdominal Surgery:  Yes (multiple laps for scar tissue removal)


AICD:  No


Appendectomy:  No


Arteriovenous Shunt:  No


Cardiac Surgery:  No


 Section:  Yes


Cholecystectomy:  No


Ear Surgery:  No


Endocrine Surgery:  No


Eye Surgery:  No


Genitourinary Surgery:  Yes


Gynecologic Surgery:  Yes (csection/ lining of uterus removed)


Insulin Pump:  No


Joint Replacement:  No


Neurologic Surgery:  Yes


Oral Surgery:  No


Pacemaker:  No


Thoracic Surgery:  No


Tonsillectomy:  Yes


Other Surgery:  Yes (colon polyp rempoval)





Social History


Alcohol Use:  No


Tobacco Use:  No


Substance Use:  Yes (SOCIAL marijuana)





Allergies-Medications


(Allergen,Severity, Reaction):  


Coded Allergies:  


     Amoxicillin (Verified  Allergy, Severe, VOMITING, 1/3/17)


     Morphine (Verified  Allergy, Severe, Hives , 1/3/17)


     Penicillin (Verified  Allergy, Severe, VOMITING, 1/3/17)


     Sulfa (Verified  Adverse Reaction, Severe, VOMITING, 1/3/17)


Comments


List of her allergies reviewed from the nursing note.


Reported Meds & Prescriptions





Reported Meds & Active Scripts


Active





Narrative Medication


List of her home medications reviewed from the nursing note.





Review of Systems


Except as stated in HPI:  all other systems reviewed are Neg





Physical Exam


Narrative


GENERAL: Awake, alert, anxious, moderate distress


SKIN: Warm and dry.


HEAD: Atraumatic. Normocephalic. 


EYES: Pupils equal and round. No scleral icterus. No injection or drainage. 


ENT: No nasal bleeding or discharge.  Mucous membranes pink and moist.


NECK: Trachea midline. No JVD. 


CARDIOVASCULAR: Regular rate and rhythm.  No murmur appreciated.


RESPIRATORY: No accessory muscle use. Clear to auscultation. Breath sounds 

equal bilaterally. 


GASTROINTESTINAL: Abdomen soft, non-tender, nondistended. Hepatic and splenic 

margins not palpable. 


MUSCULOSKELETAL: No obvious deformities. No clubbing.  No cyanosis.  No edema. 


NEUROLOGICAL: Awake and alert. No obvious cranial nerve deficits.  Motor 

grossly within normal limits. Normal speech.


PSYCHIATRIC: Appropriate mood and affect; insight and judgment normal.





Data


Data


Last Documented VS





Vital Signs








  Date Time  Temp Pulse Resp B/P Pulse Ox O2 Delivery O2 Flow Rate FiO2


 


1/3/17 21:06 97.2 103 16 159/80 98   








Orders





 Promethazine Inj (Phenergan Inj) (17 03:00)


Ketorolac Inj (Toradol Inj) (17 03:00)








[facility]


Medical Decision Making


Medical Screen Exam Complete:  Yes


Emergency Medical Condition:  Yes


Medical Record Reviewed:  Yes


Differential Diagnosis


Acute on chronic abdominal pain


Narrative Course


4:04 AM patient will be discharged home.  She was given IM Phenergan and IM 

Toradol.  She has not vomited since.





Procedures


EKG Prior to Arrival:  No





Diagnosis





 Primary Impression:  


 Chronic abdominal pain


 Additional Impression:  


 Recurrent abdominal pain


Referrals:  


Primary Care Physician


2 days





***Additional Instructions:


Follow-up with primary care.


Disposition:  01 DISCHARGE HOME


Condition:  Stable








Rojas Zavala MD 2017 04:05
rolling walker